# Patient Record
Sex: MALE | URBAN - METROPOLITAN AREA
[De-identification: names, ages, dates, MRNs, and addresses within clinical notes are randomized per-mention and may not be internally consistent; named-entity substitution may affect disease eponyms.]

---

## 2019-08-09 ENCOUNTER — OFFICE VISIT - HEALTHEAST (OUTPATIENT)
Dept: FAMILY MEDICINE | Facility: CLINIC | Age: 39
End: 2019-08-09

## 2019-08-09 DIAGNOSIS — M10.9 ACUTE GOUT OF RIGHT ELBOW, UNSPECIFIED CAUSE: ICD-10-CM

## 2019-08-09 DIAGNOSIS — M70.21 OLECRANON BURSITIS OF RIGHT ELBOW: ICD-10-CM

## 2019-08-09 DIAGNOSIS — R03.0 ELEVATED BLOOD PRESSURE READING WITHOUT DIAGNOSIS OF HYPERTENSION: ICD-10-CM

## 2019-08-09 ASSESSMENT — MIFFLIN-ST. JEOR: SCORE: 1971.74

## 2019-08-10 ENCOUNTER — COMMUNICATION - HEALTHEAST (OUTPATIENT)
Dept: SCHEDULING | Facility: CLINIC | Age: 39
End: 2019-08-10

## 2019-08-10 ENCOUNTER — OFFICE VISIT - HEALTHEAST (OUTPATIENT)
Dept: FAMILY MEDICINE | Facility: CLINIC | Age: 39
End: 2019-08-10

## 2019-08-10 DIAGNOSIS — M10.9 ACUTE GOUT OF RIGHT ELBOW, UNSPECIFIED CAUSE: ICD-10-CM

## 2019-08-14 ENCOUNTER — OFFICE VISIT - HEALTHEAST (OUTPATIENT)
Dept: FAMILY MEDICINE | Facility: CLINIC | Age: 39
End: 2019-08-14

## 2019-08-14 ENCOUNTER — COMMUNICATION - HEALTHEAST (OUTPATIENT)
Dept: NURSING | Facility: CLINIC | Age: 39
End: 2019-08-14

## 2019-08-14 DIAGNOSIS — M10.9 ACUTE GOUT OF RIGHT ELBOW, UNSPECIFIED CAUSE: ICD-10-CM

## 2019-08-14 DIAGNOSIS — M10.9 ACUTE GOUT OF RIGHT KNEE, UNSPECIFIED CAUSE: ICD-10-CM

## 2019-08-14 DIAGNOSIS — R03.0 ELEVATED BLOOD PRESSURE READING WITHOUT DIAGNOSIS OF HYPERTENSION: ICD-10-CM

## 2019-08-15 ENCOUNTER — COMMUNICATION - HEALTHEAST (OUTPATIENT)
Dept: NURSING | Facility: CLINIC | Age: 39
End: 2019-08-15

## 2019-08-20 ENCOUNTER — COMMUNICATION - HEALTHEAST (OUTPATIENT)
Dept: FAMILY MEDICINE | Facility: CLINIC | Age: 39
End: 2019-08-20

## 2019-08-21 ENCOUNTER — OFFICE VISIT - HEALTHEAST (OUTPATIENT)
Dept: FAMILY MEDICINE | Facility: CLINIC | Age: 39
End: 2019-08-21

## 2019-08-21 DIAGNOSIS — M10.9 ACUTE GOUT OF RIGHT ELBOW, UNSPECIFIED CAUSE: ICD-10-CM

## 2019-08-21 DIAGNOSIS — R03.0 ELEVATED BLOOD PRESSURE READING WITHOUT DIAGNOSIS OF HYPERTENSION: ICD-10-CM

## 2019-08-21 DIAGNOSIS — M10.9 ACUTE GOUT OF RIGHT KNEE, UNSPECIFIED CAUSE: ICD-10-CM

## 2019-09-02 ENCOUNTER — OFFICE VISIT - HEALTHEAST (OUTPATIENT)
Dept: FAMILY MEDICINE | Facility: CLINIC | Age: 39
End: 2019-09-02

## 2019-09-02 DIAGNOSIS — M10.9 GOUT OF RIGHT ELBOW, UNSPECIFIED CAUSE, UNSPECIFIED CHRONICITY: ICD-10-CM

## 2019-09-08 ENCOUNTER — OFFICE VISIT - HEALTHEAST (OUTPATIENT)
Dept: FAMILY MEDICINE | Facility: CLINIC | Age: 39
End: 2019-09-08

## 2019-09-08 DIAGNOSIS — R03.0 ELEVATED BLOOD PRESSURE READING WITHOUT DIAGNOSIS OF HYPERTENSION: ICD-10-CM

## 2019-09-08 DIAGNOSIS — M10.041 ACUTE IDIOPATHIC GOUT OF RIGHT HAND: ICD-10-CM

## 2019-09-11 ENCOUNTER — COMMUNICATION - HEALTHEAST (OUTPATIENT)
Dept: FAMILY MEDICINE | Facility: CLINIC | Age: 39
End: 2019-09-11

## 2019-09-11 ENCOUNTER — OFFICE VISIT - HEALTHEAST (OUTPATIENT)
Dept: FAMILY MEDICINE | Facility: CLINIC | Age: 39
End: 2019-09-11

## 2019-09-11 ENCOUNTER — OFFICE VISIT - HEALTHEAST (OUTPATIENT)
Dept: RHEUMATOLOGY | Facility: CLINIC | Age: 39
End: 2019-09-11

## 2019-09-11 DIAGNOSIS — M25.50 POLYARTHRALGIA: ICD-10-CM

## 2019-09-11 DIAGNOSIS — M79.601 PAIN OF RIGHT UPPER EXTREMITY: ICD-10-CM

## 2019-09-11 DIAGNOSIS — M10.9 ACUTE GOUT OF RIGHT ELBOW, UNSPECIFIED CAUSE: ICD-10-CM

## 2019-09-11 DIAGNOSIS — R52 PAIN MANAGEMENT: ICD-10-CM

## 2019-09-11 ASSESSMENT — MIFFLIN-ST. JEOR: SCORE: 1989.88

## 2019-09-12 ENCOUNTER — OFFICE VISIT - HEALTHEAST (OUTPATIENT)
Dept: FAMILY MEDICINE | Facility: CLINIC | Age: 39
End: 2019-09-12

## 2019-09-12 DIAGNOSIS — M10.9 ACUTE GOUT OF RIGHT KNEE, UNSPECIFIED CAUSE: ICD-10-CM

## 2019-09-12 DIAGNOSIS — R52 PAIN MANAGEMENT: ICD-10-CM

## 2019-09-12 DIAGNOSIS — M10.9 ACUTE GOUT OF RIGHT ELBOW, UNSPECIFIED CAUSE: ICD-10-CM

## 2019-09-12 DIAGNOSIS — M79.601 PAIN OF RIGHT UPPER EXTREMITY: ICD-10-CM

## 2019-09-12 RX ORDER — GABAPENTIN 300 MG/1
CAPSULE ORAL
Qty: 90 CAPSULE | Refills: 2 | Status: SHIPPED | OUTPATIENT
Start: 2019-09-12

## 2019-09-12 ASSESSMENT — MIFFLIN-ST. JEOR: SCORE: 2021.63

## 2019-09-18 ENCOUNTER — AMBULATORY - HEALTHEAST (OUTPATIENT)
Dept: PALLIATIVE MEDICINE | Facility: OTHER | Age: 39
End: 2019-09-18

## 2019-10-20 ENCOUNTER — OFFICE VISIT - HEALTHEAST (OUTPATIENT)
Dept: FAMILY MEDICINE | Facility: CLINIC | Age: 39
End: 2019-10-20

## 2019-10-20 DIAGNOSIS — M10.9 ACUTE GOUTY ARTHRITIS: ICD-10-CM

## 2019-10-23 ENCOUNTER — OFFICE VISIT - HEALTHEAST (OUTPATIENT)
Dept: FAMILY MEDICINE | Facility: CLINIC | Age: 39
End: 2019-10-23

## 2019-10-23 DIAGNOSIS — M10.9 ACUTE GOUT, UNSPECIFIED CAUSE, UNSPECIFIED SITE: ICD-10-CM

## 2019-10-23 ASSESSMENT — MIFFLIN-ST. JEOR: SCORE: 2044.31

## 2019-10-30 ENCOUNTER — AMBULATORY - HEALTHEAST (OUTPATIENT)
Dept: PALLIATIVE MEDICINE | Facility: OTHER | Age: 39
End: 2019-10-30

## 2019-11-06 ENCOUNTER — OFFICE VISIT - HEALTHEAST (OUTPATIENT)
Dept: FAMILY MEDICINE | Facility: CLINIC | Age: 39
End: 2019-11-06

## 2019-11-06 DIAGNOSIS — M10.9 ACUTE GOUT OF RIGHT ELBOW, UNSPECIFIED CAUSE: ICD-10-CM

## 2019-11-06 ASSESSMENT — MIFFLIN-ST. JEOR: SCORE: 2080.6

## 2020-01-08 ENCOUNTER — COMMUNICATION - HEALTHEAST (OUTPATIENT)
Dept: RHEUMATOLOGY | Facility: CLINIC | Age: 40
End: 2020-01-08

## 2020-06-10 ENCOUNTER — OFFICE VISIT - HEALTHEAST (OUTPATIENT)
Dept: FAMILY MEDICINE | Facility: CLINIC | Age: 40
End: 2020-06-10

## 2020-06-10 DIAGNOSIS — M25.521 RIGHT ELBOW PAIN: ICD-10-CM

## 2020-06-10 DIAGNOSIS — M25.50 POLYARTHRALGIA: ICD-10-CM

## 2020-06-10 DIAGNOSIS — M10.9 GOUT OF RIGHT ELBOW, UNSPECIFIED CAUSE, UNSPECIFIED CHRONICITY: ICD-10-CM

## 2020-06-10 RX ORDER — IBUPROFEN 800 MG/1
800 TABLET, FILM COATED ORAL EVERY 8 HOURS PRN
Qty: 90 TABLET | Refills: 0 | Status: SHIPPED | OUTPATIENT
Start: 2020-06-10

## 2020-06-10 RX ORDER — OXYCODONE HYDROCHLORIDE 5 MG/1
5 TABLET ORAL EVERY 6 HOURS PRN
Qty: 12 TABLET | Refills: 0 | Status: SHIPPED | OUTPATIENT
Start: 2020-06-10

## 2020-06-16 ENCOUNTER — OFFICE VISIT - HEALTHEAST (OUTPATIENT)
Dept: FAMILY MEDICINE | Facility: CLINIC | Age: 40
End: 2020-06-16

## 2020-06-16 DIAGNOSIS — M10.9 ACUTE GOUT, UNSPECIFIED CAUSE, UNSPECIFIED SITE: ICD-10-CM

## 2020-06-16 DIAGNOSIS — M25.50 POLYARTHRALGIA: ICD-10-CM

## 2020-06-16 ASSESSMENT — PATIENT HEALTH QUESTIONNAIRE - PHQ9: SUM OF ALL RESPONSES TO PHQ QUESTIONS 1-9: 0

## 2020-08-19 ENCOUNTER — OFFICE VISIT - HEALTHEAST (OUTPATIENT)
Dept: FAMILY MEDICINE | Facility: CLINIC | Age: 40
End: 2020-08-19

## 2020-08-19 DIAGNOSIS — M25.521 RIGHT ELBOW PAIN: ICD-10-CM

## 2021-05-27 ASSESSMENT — PATIENT HEALTH QUESTIONNAIRE - PHQ9: SUM OF ALL RESPONSES TO PHQ QUESTIONS 1-9: 0

## 2021-05-31 NOTE — PATIENT INSTRUCTIONS - HE
Please go to the emergency department if you notice any of the following:    Fever / chills.    Nausea.    Worsening pain.

## 2021-05-31 NOTE — PROGRESS NOTES
Subjective:      Daniele Rivera is a 39 y.o. male who presents for evaluation of follow-up of gout.  Long history of gout.  He has been seen twice in the past week for gout flares.  First visit was on 8/9/2019, second visit on 8/10/2019.  I reviewed both of these office visits.  Looks like the first visit was at [facility] and second visit was walk-in care.  Both visits were for acute gout and pain in right elbow.  He was initially put on a several day prednisone taper and limited supply of 10 mg oxycodone.  He returned to walk-in care the next day stating that nothing was helping with his pain.  Walk-in care advised him to stop prednisone and start Indocin.  He had almost completed his oxycodone from the day before.  New prescription was sent for oxycodone limited supply 15 mg.  He was recommended to follow-up with rheumatology.  He says he has called them, but was told there are not any available appointments for several months.    Today, he notes that he is still having severe pain in the right elbow due to gout.  He sometimes has shooting pains up or down the arm.  Additionally, his right knee has now started to flareup.  Pain there is also severe as well.  He has some swelling in the right elbow.  He was hoping that one of the previous providers would be willing to drain his elbow.  He says when his joints are drained the pain improves significantly.  Overall he has not had a gout flare for a few years.  This current flareup has now lasted approximately 6 to 7 days.  He says his flares usually last 2 weeks.  Past history of allopurinol use, but he has not been using this for some time.  Previous visit reports diarrhea and nausea with taking colchicine.  Today patient is wondering if we have some way for him to take this (colchicine) with reduced cost.  Additionally, he does not have insurance.  He is in the process of getting insurance.  He thinks he may have insurance by the end of this month.  He is  right-hand dominant.  He had worn a sling on the right arm, but the sling touching his elbow was more painful than not wearing the sling at all.    We do not have any old records.  He is in the process of getting [place].  He reports that in the past, when he has had a flareup, he always takes 15 mg of oxycodone regularly for 2 weeks.  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    Past medical, surgical, family history reviewed.    Patient Active Problem List   Diagnosis     Acute gout of right knee, unspecified cause     Acute gout of right elbow, unspecified cause       Current Outpatient Medications:      indomethacin (INDOCIN) 50 MG capsule, Take 1 capsule (50 mg total) by mouth 2 (two) times a day with meals., Disp: 20 capsule, Rfl: 0     predniSONE (DELTASONE) 20 MG tablet, 60mg x4 days 40mg x4 days 20mg x 4 days 10mg x 4 days., Disp: 26 tablet, Rfl: 0     oxyCODONE (ROXICODONE) 15 MG immediate release tablet, Take 1 tablet (15 mg total) by mouth every 6 (six) hours as needed for pain., Disp: 28 tablet, Rfl: 0     Objective:     No Known Allergies  Vitals:    08/14/19 0906   BP: (!) 148/92   Pulse: 80   Resp: 16     Body mass index is 34.72 kg/m .    Vitals reviewed as above.  General: Patient is alert and oriented x 3, in no apparent distress  Cardiac: Regular rate and rhythm, no murmurs  Pulmonary: lungs clear to ausculation, no crackles, rales, rhonchi, or wheezing  Musculoskeletal: Exam significantly limited today due to patient's pain, mild edema at the right elbow, no fluctuance, pain out of proportion with exam findings on gentle palpation of the right elbow, patient refuses to move the elbow at all due to pain, right knee is grossly normal, no obvious edema, again pain out of proportion with exam findings to touch on the right knee, patient holds his right arm against his body for the entire visit and walks with a limp    Assessment and  Plan:     1. Acute gout flare, right elbow and right knee.  We are limited in what we can do for him given his lack of insurance and inability to do a complete exam today.  I would not be able to remove fluid from his knee today as I noticed no obvious areas of fluctuance.  I am hesitant to drain his right elbow, due to increased risk of infection, and again lack of obvious fluctuant area.  I discussed this with patient today.  He is a bit frustrated that he cannot treat this specifically.  Patient does not think indomethacin is helping much.  He will stop that and restart his prednisone taper.  He has the prednisone at home.  He has finished his oxycodone from both previous providers.  He is requesting enough oxycodone to last him through the rest of his gout flare.  He says normally, in the past, he had larger amounts of oxycodone prescribed regularly as needed for his gout attacks.  Prescription monitoring report run for patient.  No prescriptions, other than the 2 noted above, for patient in the past year in [place] and the surrounding \[facility]\"".  Patient stated he was from [place].  [place] is not available to check in database.  [place] was searched and was negative.  Prescription sent today for 1 week for oxycodone 15 mg for patient to take 1 pill every 6 hours as needed.  This is a one time prescription.  Patient did not want to stay for scheduling today.  Referral placed for him to see rheumatology soon.  It is possible they may be able to drain his joints if appropriate.  Also referral placed for care management health insurance to check and make sure health insurance is in the process of being finalized.  Once he has insurance, would recommend returning for full physical and associated screening lab work with one of the medical doctors at our clinic.  Patient is somewhat upset that I am not able to give him more oxycodone today.  I reviewed general clinic and pharmacy narcotics policies with him.  I  also reviewed with him that if pain is not helped enough by current prescriptions, he should go in to the emergency room.  He asked that oxycodone prescription be printed for him today.  He is going to try to find a pharmacy that charges less for this medication than the one he picked it up from previously.    2.  Elevated blood pressure.  BP Readings from Last 3 Encounters:   08/14/19 (!) 148/92   08/10/19 (!) 168/104   08/09/19 (!) 154/98   Patient does not think he has been diagnosed with hypertension before.  He feels that his blood pressure is elevated due to pain.  As stated above, I would recommend he return for close follow-up of blood pressure within the next 2 to 4 weeks.    This dictation uses voice recognition software, which may contain typographical errors.

## 2021-05-31 NOTE — PROGRESS NOTES
Assessment/ Plan:     1. Acute gout of right elbow, unspecified cause  predniSONE (DELTASONE) 20 MG tablet    oxyCODONE (ROXICODONE) 10 mg immediate release tablet    Uric Acid    Basic Metabolic Panel    Ambulatory referral to Orthopedics   2. Olecranon bursitis of right elbow  Ambulatory referral to Orthopedics   3. Elevated blood pressure reading without diagnosis of hypertension       Patient has symptoms of an acute gout flare given his medical history gave patient a prednisone taper and a limited supply of oxycodone 10 mg.  Advised continued symptomatic management including NSAIDs and ice.  Encouraged to follow-up with orthopedics for additional evaluation of the elbow.  Additionally discussed rheumatology referral with patient he will let me know if he would like to pursue this.  I did discuss that rheumatology is out a couple months.  Patient was requesting his elbow to have be drained.  I discussed with patient that this is not something that I would do in clinic to see a specialist for evaluation this is best course for treatment.  Discussed with patient that if symptoms become more severe or is developing other symptoms of fever, body aches, or chills that he would need to present to the emergency department for further evaluation.  He is in agreement with this plan.    Blood pressure is elevated today.  I discussed with patient that it would be advisable for him to return to the clinic to have his blood pressure rechecked when his pain is under control to assure he is not hypertensive.  I also recommended blood work to be completed however, he declined due to not having insurance.  I placed these as a future order.    Subjective:      Daniele Rivera is a 39 y.o. male who presents for concerns of a gout flare.  He is a new patient to our clinic and was previously living in [place].  I do not have old records available.  He has had gout in multiple joints on and off over the last 10 years. The  last 2 years he was without a flair until 2 days ago.  His gout symptoms today are present in his right elbow.  He is experiencing acute severe pain in the right elbow along with swelling.  He reports that he has required surgery on this left elbow previously due to gout.  He also reports that he is frequently required to the fluid to be drained.  He has been on allopurinol in the past when he is not in a flaire but has not been on this for a long time. He did not do well on the Colchicine.  Reporting, symptoms of diarrhea and nausea.  Pain is currently rating a 9/10. He is asking for some prednisone and a limited supply of oxycodone to help with the pain.  He is currently without insurance.  But is waiting for this to be approved.    Blood pressure is elevated today.  He denies any previous history of hypertension.  He believes that his blood pressure is elevated due to acute pain.    He denies any fevers, body aches, or chills.  He denies any chest pain, shortness of breath, or difficulty breathing.    The following portions of the patient's history were reviewed and updated as appropriate: allergies, current medications, past family history, past medical history, past social history, past surgical history and problem list.    There is no problem list on file for this patient.    Past Medical History:   Diagnosis Date     Gout attack      Past Surgical History:   Procedure Laterality Date     ELBOW SURGERY Right 2017     Social History     Socioeconomic History     Marital status:      Spouse name: Not on file     Number of children: Not on file     Years of education: Not on file     Highest education level: Not on file   Occupational History     Not on file   Social Needs     Financial resource strain: Not on file     Food insecurity:     Worry: Not on file     Inability: Not on file     Transportation needs:     Medical: Not on file     Non-medical: Not on file   Tobacco Use     Smoking status: Never  "Smoker     Smokeless tobacco: Never Used   Substance and Sexual Activity     Alcohol use: Not Currently     Frequency: Never     Drug use: Never     Sexual activity: Yes     Partners: Female   Lifestyle     Physical activity:     Days per week: Not on file     Minutes per session: Not on file     Stress: Not on file   Relationships     Social connections:     Talks on phone: Not on file     Gets together: Not on file     Attends [employer] service: Not on file     Active member of club or organization: Not on file     Attends meetings of clubs or organizations: Not on file     Relationship status: Not on file     Intimate partner violence:     Fear of current or ex partner: Not on file     Emotionally abused: Not on file     Physically abused: Not on file     Forced sexual activity: Not on file   Other Topics Concern     Not on file   Social History Narrative     Not on file     Family History   Problem Relation Age of Onset     Heart disease Mother      Heart attack Mother      Gout Father      Diabetes Father        Current Outpatient Medications   Medication Sig     oxyCODONE (ROXICODONE) 10 mg immediate release tablet Take 0.5-1 tablets (5-10 mg total) by mouth every 4 (four) hours as needed for pain.     predniSONE (DELTASONE) 20 MG tablet 60mg x4 days 40mg x4 days 20mg x 4 days 10mg x 4 days.       Review of Systems   A 12 point comprehensive review of systems was negative except as noted.      Objective:      BP (!) 154/98 (Patient Site: Left Arm, Patient Position: Sitting, Cuff Size: Adult Large)   Pulse 80   Resp 16   Ht 5' 10\" (1.778 m)   Wt (!) 236 lb (107 kg)   BMI 33.86 kg/m      General appearance: alert, appears stated age and cooperative  Head: Normocephalic, without obvious abnormality, atraumatic  Lungs: clear to auscultation bilaterally  Heart: regular rate and rhythm, S1, S2 normal, no murmur, click, rub or gallop  Extremities: Right elbow is acutely tender to palpation over medial cranial " bone it is also acutely swollen. mild erythremia and warmth.  Patient is holding the arm in a bent position as this is what feels most comfortable.  He reports he has full range of motion but with pain.  Pulses: 2+ and symmetric  Skin: Skin color, texture, turgor normal. No rashes or lesions  Lymph nodes: Cervical, supraclavicular, and axillary nodes normal.  Neurologic: Grossly normal      María Berrios, SUHAIL  12:33 PM

## 2021-05-31 NOTE — TELEPHONE ENCOUNTER
He needs to establish care with a doctor (not me) for ongoing care and treatment.  He should keep the appt with Dr. Greenberg.

## 2021-05-31 NOTE — PROGRESS NOTES
Attempt 1:  Care guide left a message for the patient about [facility] enrollment.  If the patient is trying to call the Care guide back transfer them to [facility] at [phone].    Next Outreach: 8/15/19

## 2021-05-31 NOTE — TELEPHONE ENCOUNTER
Pharmacist calling to verify that RX for Oxycodone 15 mg had been written today. Information verified in epic--written by PA @ [facility] [place] today. CLG    Reason for Disposition    Pharmacy calling with prescription question and triager answers question    Protocols used: MEDICATION QUESTION CALL-A-

## 2021-05-31 NOTE — PROGRESS NOTES
Community Health Worker called and left a message for the patient.  If the patient is returning my call, please transfer the patient to [facility] at ext. [phone].   Patient has been mailed a unreachable letter and was provided with CHW contact information if they are interested in accessing Clinic Care Coordination.  Order for Care Management has been closed, no further outreach will be done at this time and patient can be re-referred.

## 2021-05-31 NOTE — PROGRESS NOTES
Assessment and Plan     Daniele was seen today for gout.    Diagnoses and all orders for this visit:    Acute gout of right elbow, unspecified cause  -     indomethacin (INDOCIN) 50 MG capsule; Take 1 capsule (50 mg total) by mouth 2 (two) times a day with meals.  -     oxyCODONE (ROXICODONE) 15 MG immediate release tablet; Take 1 tablet (15 mg total) by mouth every 4 (four) hours as needed for pain.         HPI     Chief Complaint   Patient presents with     Gout     pains in R elbow x few days, fluid buildup        Daniele Rivera is a 39 y.o. male seen today for right elbow pain.    Long-standing history of gout with frequent flares of multiple joints.  Pain in his right elbow for the last several days, identical to previous gouty flares.  He has required surgery in this elbow before to remove tophi.  Started prednisone yesterday with no improvement.  Prescribed allopurinol but when he went 2 years without any flares he stopped taking it.  This was several months ago.  No fevers, chills, nausea.       Current Outpatient Medications:      oxyCODONE (ROXICODONE) 10 mg immediate release tablet, Take 0.5-1 tablets (5-10 mg total) by mouth every 4 (four) hours as needed for pain., Disp: 8 tablet, Rfl: 0     predniSONE (DELTASONE) 20 MG tablet, 60mg x4 days 40mg x4 days 20mg x 4 days 10mg x 4 days., Disp: 26 tablet, Rfl: 0     indomethacin (INDOCIN) 50 MG capsule, Take 1 capsule (50 mg total) by mouth 2 (two) times a day with meals., Disp: 20 capsule, Rfl: 0     oxyCODONE (ROXICODONE) 15 MG immediate release tablet, Take 1 tablet (15 mg total) by mouth every 4 (four) hours as needed for pain., Disp: 10 tablet, Rfl: 0     Reviewed and updated: medical history, medications and allergies.     Review of Systems     General: Denies fever, chills, fatigue.  Respiratory: Denies dyspnea, cough, wheezing.  GI: Denies nausea, vomiting, diarrhea, constipation.     Objective     Vitals:    08/10/19 1408   BP: (!) 168/104   Pulse:  79   Resp: 14   Temp: 99.4  F (37.4  C)   TempSrc: Oral   SpO2: 99%   Weight: (!) 240 lb 3.2 oz (109 kg)        Reviewed vital signs.  General: Appears calm, comfortable. Answers questions quickly and appropriately with clear speech. No apparent distress.  Skin: Pink, warm, dry.  HENT: Normocephalic, atraumatic.  Neck: Supple.  Cardiovascular: Strong, regular radial pulse.  Respiratory: Normal respiratory effort.  MSK: Supporting right elbow at 90 degrees.  Significant pain with any flexion or extension of the elbow.  Pain with any light pressure over the joint.  The olecranon bursa also appears to be enlarged.  There is no heat or erythema overlying the joint to the bursa.  Neuro: Memory and cognition appear normal. Normal gait.  Psych: Mood and affect appear normal.     Imaging:   No results found.    Labs:  No results found for this or any previous visit (from the past 24 hour(s)).     Medical Decision-Making     Daniele is a well-appearing 39-year-old male with no significant medical history aside from gout.  Several days of severe pain in the right elbow.  Pain is exacerbated with any movement of the elbow or with light pressure on the joint.  Over the last 2 days he is developed swelling over the olecranon bursa.  His appearance is nontoxic.  He is not tachycardic, tachypneic, hypoxic, or febrile.  He has failed a couple days of prednisone.  In the past these had successful treatment with indomethacin.  I did prescribe a small supply of indomethacin recommending that he start 3 times daily and then switch to once a day once the flare starts to improve.    Overall I have low concern for septic joint as the joint is not erythematous or hot and he has no systemic symptoms.  Additionally he has an extensive history of gout and states this feels identical to previous flares.  If his symptoms do not improve, he will follow-up with orthopedics.    Reviewed red flags that would trigger a prompt return to the clinic as  noted below under patient instructions.  He expressed understanding of these directions and is in agreement with the plan.     Patient Instructions     Patient Instructions   Please go to the emergency department if you notice any of the following:    Fever / chills.    Nausea.    Worsening pain.        Discussed benefit vs risk of medications, dosing, side effects.  Patient was able to verbalize understanding.  After visit summary was provided for patient.     Miguel Mena PA-C

## 2021-05-31 NOTE — TELEPHONE ENCOUNTER
"Attempted to leave message two times. Kept on getting cut off. Will try again later. #1  \" Okay to relay message\"    "

## 2021-05-31 NOTE — PROGRESS NOTES
Dx pseudogout and gout  Made on joint aspiration.  Flares in knees and elbows.    Gout in knee, pseudo gout in elbow and toe.    [facility].  Dr Rubin    In [place]   Rheumatologist        Allopurinol associated with flares.    Had taken prn flare.   Admittedly did not taken well.    Probenecid taken pretty well.   Did well as took regularly.  Two years.      Stopped as thought not needed.    Colchicine the worst drug.  Poopin.   Expensive..  Unable to tolerate the gi effects    Indocin...   Ibuprofen 800 3-4 /d    Did well.  Also need pain meds additionally for night sleep.    Prednisone I loved but it made me gain wt.   Was tapering from 60.  Two wks at a time.    Has appt September 11.  [facility]    bp are normal when not in pain     my pain is so bad I need 15mg oxycodone.  I think it's because of pseudogout and gout    OBJECTIVE:   Vitals:    08/21/19 1459   BP: 166/88   Pulse: 92   Resp: 12      Eyes: non icteric, noninflamed  Lungs: no resp distress  Heart: regular  Ankles: no edema  Muscles: nontender  Mental status: mild anxious    Right knee mild effusion and palpably warmer than left.   Walks consistent with significant pain on weight bearing    Right elbow olecranon enlargement and skin scars consistent with tophi and hx prior surgery to remove    Neuro: nonfocal    Body mass index is 33.86 kg/m .     ASSESSMENT/PLAN:    Additional diagnoses and related orders:  1. Acute gout of right knee, unspecified cause     2. Acute gout of right elbow, unspecified cause  predniSONE (DELTASONE) 5 MG tablet    oxyCODONE (ROXICODONE) 15 MG immediate release tablet   3. Elevated blood pressure reading without diagnosis of hypertension       I spent 25 minutes with patient face-to-face, of which 50% or greater was spent in counseling and coordination of care in regards to patient's problems and diagnoses as listed above. Please see plan above.   re usual gout flare of 15/d prednisone.   Will use  15-30 initially to calm down then taper off.    Prn oxycodone.   Scripts given printed.  Has appt [facility] rheumatologist 9/11      Asked to have info sent here.  Try to see that provider off meds  Get records to the rheumatologist from prior providers.  Especially when dx made.

## 2021-05-31 NOTE — TELEPHONE ENCOUNTER
New Appointment Needed  What is the reason for the visit:    Same Date/Next Day Appt Request  What is the reason for your visit?: follow up gout    Provider Preference: PCP only  How soon do you need to be seen?: patient is scheduled on 8/21 with Dr Greenberg but would prefer to be seen by Kristina Cruz on 8/20 or 8/21  Waitlist offered?: Yes  Okay to leave a detailed message:  Yes

## 2021-06-01 NOTE — TELEPHONE ENCOUNTER
Per last visit, pt due to see rheumatologist today.  Should address med issues at his visit today.

## 2021-06-01 NOTE — PROGRESS NOTES
Chief Complaint   Patient presents with     Edema     swelling in elbow and fingers seen 8/2/19 and 9/8/19 Sx not improving, has not seen PCP and was at rheum seen today     History of Present Illness: Rooming staff notes reviewed. Most of pain is in right 2nd, 3rd, and 4th fingers. He states this is related to his gout problem, and he continues to have problems with gout in his right elbow. He saw the rheumatologist on day of exam before he saw me, with the documentation not yet being available to view. He told me that he was advised he needed to be seen by his primary care provider to get any narcotic medications. He told me he was not able to get an appointment to see Dr. Greenberg for two weeks, and requested enough narcotic medication to last for that long, which I told him was not typically done through an urgent care setting or by urgent care providers with no long term relationship with him. I advised him that I would provider enough narcotic medication until he could get in to his primary care clinic within the next week.     Review of systems: See history of present illness, all others negative.     Current Outpatient Medications   Medication Sig Dispense Refill     ibuprofen (ADVIL,MOTRIN) 800 MG tablet Take 1 tablet (800 mg total) by mouth every 8 (eight) hours as needed for pain. 21 tablet 2     predniSONE (DELTASONE) 20 MG tablet Take 80 mg by mouth daily for 3 days, THEN 60 mg daily for 3 days, THEN 40 mg daily for 4 days, THEN 20 mg daily for 4 days, THEN 10 mg daily for 4 days. 35 tablet 0     oxyCODONE (ROXICODONE) 15 MG immediate release tablet Take 1 tablet (15 mg total) by mouth every 6 (six) hours as needed for pain. 6 tablet 0     No current facility-administered medications for this visit.      Past Medical History:   Diagnosis Date     Gout attack       Past Surgical History:   Procedure Laterality Date     ELBOW SURGERY Right 2017    gouty tophi      Social History     Tobacco Use     Smoking  status: Never Smoker     Smokeless tobacco: Never Used   Substance Use Topics     Alcohol use: Not Currently     Frequency: Never     Drug use: Never        Family History   Problem Relation Age of Onset     Heart disease Mother      Heart attack Mother      Gout Father      Diabetes Father        Vitals:    09/11/19 1102 09/11/19 1103   BP: (!) 151/92 (!) 152/94   Patient Site: Right Arm Left Arm   Patient Position: Sitting Sitting   Cuff Size: Adult Regular Adult Regular   Pulse: 87    Temp: 98.7  F (37.1  C)    TempSrc: Oral    SpO2: 97%    Weight: (!) 246 lb 8 oz (111.8 kg)        EXAM:   General: Vital signs reviewed, notable for elevated blood pressure similar to last exam in walk in care clinic. Patient is in some distress due to hand and elbow discomfort, and I believe anxiety about his care arrangements. Breathing is non labored appearing. Patient is alert and oriented x 3.     Physical exam is significant for moderate edema to his right elbow olecranon region, and right 2nd, 3rd, and 4th fingers.    Assessment/Plan   1. Pain management  oxyCODONE (ROXICODONE) 15 MG immediate release tablet       Patient Instructions   Keep your appointment made for you at your primary care clinic tomorrow for further pain management. You should get any narcotic management through this clinic when possible. I recommend you not be charged for this visit since you told me you had difficulty initially getting in to see your primary care provider sooner then in 2 weeks. When your pain is better controlled, you should discuss with your primary care provider monitoring this, and addressing possible hypertension.       Maurizio Welch, DO

## 2021-06-01 NOTE — TELEPHONE ENCOUNTER
Who is calling:  Patient  Reason for Call:  Patient would like to speak with PCP about his medications.   Date of last appointment with primary care: 8/21/19  Okay to leave a detailed message: Yes

## 2021-06-01 NOTE — PROGRESS NOTES
Pt returns saying rheumatologist said I was to write the narcotics    Continued pain    Says has appt at a pain clinic on the fourth of next month.    Severe pain.  Feels led around and not listened to     Reports his encounter different than depicted in rheum note         OBJECTIVE:   Vitals:    09/12/19 1545   BP: (!) 160/96   Pulse: 100      Eyes: non icteric, noninflamed  Lungs: no resp distress  Heart: regular  Ankles: no edema  Muscles: nontender  Mental status: euthymic  Neuro: nonfocal    Body mass index is 35.44 kg/m .    holds arm close to chest.    No apparent visible inflammation in hand where pain is described    ASSESSMENT/PLAN:    Referred to pain clinic.   Told this rx for oxycodone was last from here as there is no indication for ongoing use that is apparent during the last several evaluations.  Pt told to get records [place] prior provider [place] of [place] so they are available to pain clinic.    Directed to follow up with rheumatology as suggested.    For the chronic pain pt is started on gabapentin and to have the pain clinic take over the management of the pain.  If unable to bridge to pain management will titrate the gabapentin.  No more narcotic to be written.    Addendum  Per [place]   28 pills for 12 days from 8/21 to 9/2  46 pills for 10 days from 9/2 to today 9/12    Pt told that today's prescription is to last to the pain clinic appt and to be used very sparingly as it is not meant to be ongoing.      1. Acute gout of right knee, unspecified cause  Ambulatory referral to Pain Clinic    gabapentin (NEURONTIN) 300 MG capsule    oxyCODONE (ROXICODONE) 15 MG immediate release tablet   2. Acute gout of right elbow, unspecified cause  Ambulatory referral to Pain Clinic    gabapentin (NEURONTIN) 300 MG capsule    oxyCODONE (ROXICODONE) 15 MG immediate release tablet   3. Pain of right upper extremity  Ambulatory referral to Pain Clinic    gabapentin (NEURONTIN) 300 MG capsule    oxyCODONE (ROXICODONE) 15  MG immediate release tablet   4. Pain management  oxyCODONE (ROXICODONE) 15 MG immediate release tablet     I spent 25 minutes with patient face-to-face, of which 50% or greater was spent in counseling and coordination of care in regards to patient's problems and diagnoses as listed above. Please see plan above.

## 2021-06-01 NOTE — PROGRESS NOTES
Referral received to the [facility] pain center.  Referral note indicated the patient has an appointment at a pain clinic on the fourth of next month.  I have left a message with the patient to clarify so that we are not duplicating evaluations.

## 2021-06-01 NOTE — PATIENT INSTRUCTIONS - HE
Keep your appointment made for you at your primary care clinic tomorrow for further pain management. You should get any narcotic management through this clinic when possible. I recommend you not be charged for this visit since you told me you had difficulty initially getting in to see your primary care provider sooner then in 2 weeks. When your pain is better controlled, you should discuss with your primary care provider monitoring this, and addressing possible hypertension.

## 2021-06-01 NOTE — PROGRESS NOTES
ASSESSMENT AND PLAN:  Daniele Rivera 39 y.o. male is seen here on 09/11/19 for evaluation of severe pain affecting his right upper extremity involving his right hand, right elbow, background history of gout which appears to be crystal proven.  He is been started on 80 mg of prednisone 3 days ago.  He is beginning to notice improvement surgeon is a right elbow.  Today I have outlined to him that while it is likely that what he is experiencing is a flareup of gout, however other possibilities need to get under consideration, including septic arthritis such as of the right elbow, hands indication for aspiration and if appropriate injection with corticosteroids.  He would like to defer that.  One observation which is important to note here is the marked discrepancy between the objective findings of inflammation both on examination and also no definite ultrasonographic findings of hyperemia on examination of the PIPs where he complains of exquisite pain to the point where he was very reluctant for me to properly examine his right hand.  In view of this I have recommended that he considers an MRI of the right hand.  He would like to declined that for now in part because of insurance reasons.  He wondered if I could prescribe pain medications.  In view of his limitations in terms of following up on today's recommendations  he will return for follow-up in the next 4 weeks or so.      Diagnoses and all orders for this visit:    Acute gout of right elbow, unspecified cause    Pain of right upper extremity    Polyarthralgia      HISTORY OF PRESENTING ILLNESS:  Daniele Rivera, 39 y.o., male is here for severe pain.  This is in his right upper extremity.  He noted the pain is in his right hand, pointing to the PIPs and the DIPs of the right ring middle and index fingers, the right elbow, having started from the past 2 months.  He recalls a history of gout and pseudogout.  He reports that it was 9 years ago when he first  started hurting.  It was in his first MTP.  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  He had not had flareup during the past 3 years.  He has taken intermittently allopurinol and at times pain medications.  2 months ago he started hurting in the right knee, right hand and right elbow.  The knee pain has improved.  He was started on prednisone.  He feels that it is not doing as good a job as he had hoped for.  He reports no history of trauma.  There is no kidney stone history.  He noted that the pain level is 10/10.  He is unable to do any of his day-to-day activities.  There is no family history of gout rheumatoid arthritis psoriasis ulcerative colitis Crohn's disease.   Further historical information and ADL limitations as noted in the multidimensional health assessment questionnaire attached in the EMR. Rest of the 13 system ROS is negative.     ALLERGIES:Patient has no known allergies.    PAST MEDICAL/ACTIVE PROBLEMS/MEDICATION/ FAMILY HISTORY/SOCIAL DATA:  The patient has a family history of  Past Medical History:   Diagnosis Date     Gout attack      Social History     Tobacco Use   Smoking Status Never Smoker   Smokeless Tobacco Never Used     Patient Active Problem List   Diagnosis     Acute gout of right knee, unspecified cause     Acute gout of right elbow, unspecified cause     Elevated blood pressure reading without diagnosis of hypertension     Current Outpatient Medications   Medication Sig Dispense Refill     ibuprofen (ADVIL,MOTRIN) 800 MG tablet Take 1 tablet (800 mg total) by mouth every 8 (eight) hours as needed for pain. 21 tablet 2     predniSONE (DELTASONE) 20 MG tablet Take 80 mg by mouth daily for 3 days, THEN 60 mg daily for 3 days, THEN 40 mg daily for 4 days, THEN 20 mg daily for 4 days, THEN 10 mg daily for 4 days. 35 tablet 0     oxyCODONE (ROXICODONE) 15 MG immediate release tablet Take 1 tablet (15 mg  "total) by mouth every 6 (six) hours as needed for pain. 6 tablet 0     No current facility-administered medications for this visit.        COMPREHENSIVE EXAMINATION:  Vitals:    09/11/19 1011   BP: (!) 142/100   Patient Site: Right Arm   Patient Position: Sitting   Cuff Size: Adult Large   Pulse: 88   Weight: (!) 240 lb (108.9 kg)   Height: 5' 10\" (1.778 m)     A well appearing alert oriented male. Vital data as noted above. His eyes without inflammation/scleromalacia. ENTwithout oral mucositis, thrush, nasal deformity, external ear redness, deformity. His neck is without lymphadenopathy and supple. Lungs normal sounds, no pleural rub. Heart auscultation normal rate, rhythm; no pericardial rub and murmurs. Abdomen soft, non tender, no organomegaly. Skin examined for heliotrope, malar area eruption, lupus pernio, periungual erythema, sclerodactyly, papules, erythema nodosum, purpura, nail pitting, onycholysis, and obvious psoriasis lesion. Neurological examination shows normal alertness, speech, facial symmetry, tone and power in upper and lower extremities. The joint examination is performed for swelling, tenderness, warmth, erythema, and range of motion in the following joints: DIPs, PIPs, MCPs, wrists, first CMC's, elbows, shoulders, hips, knees, ankles, feet; spine for range of motion and paraspinal muscles for tenderness. The salient  findings are: He has swelling on the left elbow, olecranon area, scar from prior surgery, he is holding his right arm in semiflexed position, he expresses exquisite tenderness in the PIPs, DIPs of the index middle and ring finger without appreciable synovitis as for as can be ascertained.  None of his other palpable joints of all of the upper or lower extremities were tender inflamed or swollen.  He does not have dactylitis of digits in either of the toes.  He has nodularity such as along the left olecranon region.    LAB / IMAGING DATA:  No results found for: ALT, ALBUMIN, " CREATININE    No results found for: WBC, HGB, PLT    No results found for: VIOLETTA, RF, SEDRATE

## 2021-06-01 NOTE — PROGRESS NOTES
Walk In Care Note                                                                                 Date of Visit: 9/8/2019     Chief Complaint   Daniele Rivera is a(n) 39 y.o. Patient Declined male who presents to Walk In Care with the following complaint(s):  Gout (IN RIGHT ARM AND FINGERS (FINGERS ARE THE WORST) IF GETS RX MAKE IT PAPER)       Assessment and Plan   1. Acute idiopathic gout of right hand  - predniSONE (DELTASONE) 20 MG tablet; Take 80 mg by mouth daily for 3 days, THEN 60 mg daily for 3 days, THEN 40 mg daily for 4 days, THEN 20 mg daily for 4 days, THEN 10 mg daily for 4 days.  Dispense: 35 tablet; Refill: 0  - oxyCODONE (ROXICODONE) 15 MG immediate release tablet; Take 1 tablet (15 mg total) by mouth every 6 (six) hours as needed (severe pain).  Dispense: 12 tablet; Refill: 0    2. Elevated blood pressure reading without diagnosis of hypertension      Patient with reported history of both severe gout and pseudogout, with reported intolerance to allopurinol and colchicine, presenting with flare in the right hand over the past 2 days. Recent visits for gout symptoms have been reviewed in Epic. He was treated for a flare in the right elbow with prednisone burst / taper (60 mg x 4 days, 40 mg x 4 days, 20 mg x 4 days, 10 mg x 4 days) and oxycodone 10 mg tablets (#8 tablets) by Primary Care on 8/9/2019. He was also referred to Orthopedics at that time. He was seen the following day in Walk In Care and prescribed indomethacin 50 mg two times a day and oxycodone 15 mg tablets (#10 tablets). He was seen by Primary Care on 8/14/2019 for ongoing issues with the right elbow, at which time he received a refill of oxycodone 15 mg tablets (#28 tablets) and was referred to Rheumatology. He was seen by Primary Care on 8/21/2019 for ongoing symptoms in his right elbow and new symptoms in his right knee, at which time he was provided a prescription for #100 tablets of prednisone 5 mg with instructions to take 30 mg  daily x 3 days, 20 mg daily x 3 days, and 10 mg daily x 3 days. He also received a refill of oxycodone 15 mg tablets (#28 tablets) on 8/21/2019. He presented to Walk In Care again on 9/2/2019 due to symptoms in the right elbow and was prescribed prednisone 20 mg daily x 7 days, oxycodone 15 mg (#28 tablets), and ibuprofen 800 mg. His elbow pain has improved somewhat with prednisone 20 mg daily but he has now had severe pain in his right hand for the past 2 days. Treating with higher dose prednisone burst / taper as listed above. Agreed to provide a refill of a limited quantity of oxycodone 15 mg tablets; reviewed Patient Report in the [place] Prescription Monitoring Program prior to prescribing this opoid and noted no prescriptions other than those provided by various [facility] providers as detailed above. Instructed patient to follow up with Rheumatology as scheduled on 9/11/2019.     Patient to follow up with Primary Care provider regarding elevated blood pressure.     Counseled patient regarding assessment and plan for evaluation and treatment. Questions were answered. See AVS for the specific written instructions and educational handout(s) regarding gout flare that were provided at the conclusion of the visit.     Discussed signs / symptoms that warrant urgent / emergent medical attention.     Follow up as scheduled for consultation with Rheumatology on 9/11/2019.      History of Present Illness   Primary symptom: Pain  Onset: 2 day(s) ago  Location: Right hand 2nd-4th PIP joints  Radiation: Shooting pain up the arm  Progression: Worsening  Frequency: Constant  Description: Sharp  Rating (0-10): 10  Exacerbating factors: Movement, touching the area  Relieving factors: None  Associated bruising: No  Associated swelling: Yes, in the PIP joints and the right elbow  Associated erythema: Yes  Associated fevers: No  Associated chills: No  Home therapies utilized: Prednisone 20 mg daily that was prescribed on  9/2/2019 and oxycodone 15 mg four times a day as needed, which he ran out of yesterday.   Known injury: No  History of similar pain: Yes, with gout / pseudogout flares.   History of surgery in this area: Excision of tophi from the right elbow.   Tobacco use / exposure: No     Review of Systems   Review of Systems   All other systems reviewed and are negative.       Physical Exam   Vitals:    09/08/19 1330 09/08/19 1345   BP: (!) 165/99 (!) 188/108   Pulse: 84    Resp: 16    Temp: 98.6  F (37  C)    TempSrc: Oral    SpO2: 100%    Weight: (!) 240 lb (108.9 kg)      Physical Exam  Vitals signs and nursing note reviewed.   Constitutional:       General: He is in acute distress (mildly, due to right hand and elbow pain).      Appearance: He is well-developed and overweight. He is not toxic-appearing.   Cardiovascular:      Rate and Rhythm: Normal rate and regular rhythm.      Heart sounds: S1 normal and S2 normal. No murmur. No friction rub. No gallop.    Pulmonary:      Effort: Pulmonary effort is normal.      Breath sounds: Normal breath sounds. No stridor. No wheezing, rhonchi or rales.   Musculoskeletal:      Right elbow: He exhibits decreased range of motion and swelling (and warmth). Tenderness found. Medial epicondyle, lateral epicondyle and olecranon process tenderness noted.      Right hand: He exhibits decreased range of motion, tenderness and swelling (and erythema / warmth over the PIP joint of the 2nd, 3rd, and 4th fingers). He exhibits normal capillary refill. Normal sensation noted. Decreased strength noted.   Skin:     General: Skin is warm and dry.      Capillary Refill: Capillary refill takes less than 2 seconds.      Coloration: Skin is not pale.   Neurological:      Mental Status: He is alert and oriented to person, place, and time.          Diagnostic Studies   Laboratory:  N/A  Radiology:  N/A  Electrocardiogram:  N/A     Procedure Note   N/A     Pertinent History   The following portions of the  patient's history were reviewed and updated as appropriate: allergies, current medications, past family history, past medical history, past social history, past surgical history and problem list.    Patient has Acute gout of right knee, unspecified cause; Acute gout of right elbow, unspecified cause; and Elevated blood pressure reading without diagnosis of hypertension on their problem list.    Patient has a past medical history of Gout attack.    Patient has a past surgical history that includes Elbow surgery (Right, 2017).    Patient's family history includes Diabetes in his father; Gout in his father; Heart attack in his mother; Heart disease in his mother.    Patient reports that he has never smoked. He has never used smokeless tobacco. He reports that he drank alcohol. He reports that he does not use drugs.     Pipe Bush MD  [facility] [place]

## 2021-06-01 NOTE — TELEPHONE ENCOUNTER
Relay message below, also mention that pt is schedule to see PCP 09/12/19 pt stated will wait until then.

## 2021-06-02 NOTE — PATIENT INSTRUCTIONS - HE
Prednisone:  Take 40 mg twice a day (total 80 mg) for 2 days,   Then 40 mg in morning, 20 mg in afternoon (total 60 mg) for 3 days,   Then 40 mg daily and taper as previous

## 2021-06-02 NOTE — PROGRESS NOTES
"Subjective:      Patient ID: Daniele Rivera is a 39 y.o. male.    Chief Complaint:    HPI  Daniele Rivera is a 39 y.o. male form the [place] area that has relocated to [place] who presents today complaining of a history of gout or pseudogout.    He is here today ostensibly requesting pain medication for his condition.     He shares that he doesn't get an aspiration of the area because \"it may infect the joint.\"    He has had several flares. Review of the chart doesn't show any labs that have been drawn recently.    He was seen in our urgent care twice in September and by family practice on 9/12/2019 and referred to pain clinic and rheumatology. He was informed of last refill of oxycodone out of family practice and urgent care.    Patient is here ostensibly for medication management of his gout.      Past Medical History:   Diagnosis Date     Gout attack        Past Surgical History:   Procedure Laterality Date     ELBOW SURGERY Right 2017    gouty tophi       Family History   Problem Relation Age of Onset     Heart disease Mother      Heart attack Mother      Gout Father      Diabetes Father        Social History     Tobacco Use     Smoking status: Never Smoker     Smokeless tobacco: Never Used   Substance Use Topics     Alcohol use: Not Currently     Frequency: Never     Drug use: Never       Review of Systems  As above in HPI, otherwise balance of Review of Systems are negative.  No current fever chills or night sweats.  Objective:     BP (!) 172/111   Temp 98.9  F (37.2  C) (Oral)   Resp 16   Wt (!) 251 lb 12.8 oz (114.2 kg)   SpO2 98%   BMI 36.13 kg/m      Physical Exam  General: Patient is uncomfortable in the office  HEENT: Head is normocephalic atraumatic   eyes are PERRL EOMI sclera anicteric   Skin: Without rash nondiaphoretic  Musculoskeletal: right elbow with olecranon bursa swelling. Doesn't appear to be the joint.  He is guarding and doesn't allow ROM testing or " examination.        Assessment:     Procedures    The encounter diagnosis was Acute gouty arthritis.    Plan:     Patient was seen by rheumatology on 9/11 and noted to have recommended aspiration and possible steroid injection and MRI but the patient declined.  Today I recommended that we draw blood to look for a CBC, uric acid ESR and CRP and take an x-ray of the right elbow.  Patient declined this in the office.    Patient is citing that he just wants to be treated for what he has had in the past as gout.  Since he has been seen by primary care provider, rheumatology, and twice before in our urgent care I did not feel it appropriate to give narcotic medication.  This request was outlined by his primary care provider.  The patient also identified that he did not go to the pain clinic because of insurance reasons but he is here in the urgent care, presumably able to pay cash for an office visit.  I felt that a work-up to include aspiration of the bursa to look for gout or pseudogout x-ray and labs to confirm since there has been no recent labs in the chart to confirm uric acid levels.  Patient did not want to wait for further testing and left the office encounter before conclusion of the visit with the after visit summary and treatment was performed.  No labs were drawn because the patient left the office visit.

## 2021-06-02 NOTE — PROGRESS NOTES
She did not is been referred to the [facility] pain center.  Please see my note from 9/17, leaving a message with the patient that I saw the records he was referred to another pain clinic and did not want to duplicate referrals.    He is called with a new number [phone].  Reviewing the chart he had been seen in the emergency room, noted that he did not yet have insurance so has not gone to a pain center.  I have left a message again to review above.

## 2021-06-02 NOTE — PROGRESS NOTES
"ASSESSMENT/PLAN:  1. Acute gout, unspecified cause, unspecified site  oxyCODONE (ROXICODONE) 15 MG immediate release tablet       This is a 38 yo male with   1.  Acute gout - he is careful to tell me that he cannot be treated with traditional anti-inflammatories.  He has been maintained on prednisone and opiates.  We discussed that opiates are not the standard treatment for this condition.  He will need referral to the rheumatologist - as he is quite insistent that he has gout and pseudogout.  He doesn't feel he can work due to his current pain.  I will refill Oxycodone for only a few tablets.=  Return in about 4 weeks (around 11/20/2019) for Recheck.      Medications Discontinued During This Encounter   Medication Reason     predniSONE (DELTASONE) 20 MG tablet      Patient Instructions   Prednisone:  Take 40 mg twice a day (total 80 mg) for 2 days,   Then 40 mg in morning, 20 mg in afternoon (total 60 mg) for 3 days,   Then 40 mg daily and taper as previous      Chief Complaint:  Chief Complaint   Patient presents with     Gout       HPI:   Daniele Rivera is a 39 y.o. 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 -     In first couple years , \"crippling me\"  Had flare ups for a long time, then none for a couple years    Denies alcohol, smoking, denies eating red meats  Wanting it just to go away  Had aspiration of right elbow - was \"pseudogout\"    Has referral to pain clinic   Talked to them - they will send him his paperwork (hadn't gotten it before)    Takes ibuprofen instead of indomethacin   Taking prednisone 40 mg daily - on a taper -             PMH:   Patient Active Problem List    Diagnosis Date Noted     Pain of right upper extremity 09/11/2019     Acute gout of right knee, unspecified cause " 08/14/2019     Acute gout of right elbow, unspecified cause 08/14/2019     Elevated blood pressure reading without diagnosis of hypertension 08/14/2019     Past Medical History:   Diagnosis Date     Gout attack      Past Surgical History:   Procedure Laterality Date     ELBOW SURGERY Right 2017    gouty tophi     Social History     Socioeconomic History     Marital status:      Spouse name: Not on file     Number of children: Not on file     Years of education: Not on file     Highest education level: Not on file   Occupational History     Not on file   Social Needs     Financial resource strain: Not on file     Food insecurity:     Worry: Not on file     Inability: Not on file     Transportation needs:     Medical: Not on file     Non-medical: Not on file   Tobacco Use     Smoking status: Never Smoker     Smokeless tobacco: Never Used   Substance and Sexual Activity     Alcohol use: Not Currently     Frequency: Never     Drug use: Never     Sexual activity: Yes     Partners: Female   Lifestyle     Physical activity:     Days per week: Not on file     Minutes per session: Not on file     Stress: Not on file   Relationships     Social connections:     Talks on phone: Not on file     Gets together: Not on file     Attends Hinduism service: Not on file     Active member of club or organization: Not on file     Attends meetings of clubs or organizations: Not on file     Relationship status: Not on file     Intimate partner violence:     Fear of current or ex partner: Not on file     Emotionally abused: Not on file     Physically abused: Not on file     Forced sexual activity: Not on file   Other Topics Concern     Not on file   Social History Narrative     Not on file     Family History   Problem Relation Age of Onset     Heart disease Mother      Heart attack Mother      Gout Father      Diabetes Father        Meds:    Current Outpatient Medications:      ibuprofen (ADVIL,MOTRIN) 800 MG tablet, Take 1 tablet  "(800 mg total) by mouth every 8 (eight) hours as needed for pain., Disp: 21 tablet, Rfl: 2     gabapentin (NEURONTIN) 300 MG capsule, Two times a day one week then three times a day, Disp: 90 capsule, Rfl: 2     oxyCODONE (ROXICODONE) 15 MG immediate release tablet, Take 1 tablet (15 mg total) by mouth every 6 (six) hours as needed for pain., Disp: 10 tablet, Rfl: 0    Allergies:  No Known Allergies    ROS:  Pertinent positives as noted in HPI; otherwise 12 point ROS negative.      Physical Exam:  EXAM:  /88   Pulse 84   Temp 98.4  F (36.9  C) (Oral)   Resp 18   Ht 5' 10\" (1.778 m)   Wt (!) 252 lb (114.3 kg)   BMI 36.16 kg/m     Gen:  NAD, appears well, well-hydrated  HEENT:  TMs nl, oropharynx benign, nasal mucosa nl, conjunctiva clear  Neck:  Supple, no adenopathy, no thyromegaly, no carotid bruits, no JVD  Lungs:  Clear to auscultation bilaterally  Cor:  RRR no murmur  Abd:  Soft, nontender, BS+, no masses, no guarding or rebound, no HSM  Extr:  Mild swelling and tenderness, mostly right elbow  Neuro:  No asymmetry  Skin:  Warm/dry        Results:  No results found for this or any previous visit.          "

## 2021-06-03 VITALS
WEIGHT: 247 LBS | BODY MASS INDEX: 35.36 KG/M2 | HEART RATE: 100 BPM | HEIGHT: 70 IN | DIASTOLIC BLOOD PRESSURE: 96 MMHG | SYSTOLIC BLOOD PRESSURE: 160 MMHG

## 2021-06-03 VITALS
WEIGHT: 252 LBS | TEMPERATURE: 98.4 F | BODY MASS INDEX: 36.08 KG/M2 | DIASTOLIC BLOOD PRESSURE: 88 MMHG | RESPIRATION RATE: 18 BRPM | SYSTOLIC BLOOD PRESSURE: 162 MMHG | HEIGHT: 70 IN | HEART RATE: 84 BPM

## 2021-06-03 VITALS — WEIGHT: 236 LBS | BODY MASS INDEX: 33.86 KG/M2

## 2021-06-03 VITALS
BODY MASS INDEX: 37.22 KG/M2 | HEART RATE: 88 BPM | DIASTOLIC BLOOD PRESSURE: 82 MMHG | RESPIRATION RATE: 16 BRPM | HEIGHT: 70 IN | SYSTOLIC BLOOD PRESSURE: 158 MMHG | WEIGHT: 260 LBS

## 2021-06-03 VITALS
OXYGEN SATURATION: 100 % | TEMPERATURE: 98.6 F | HEART RATE: 84 BPM | BODY MASS INDEX: 34.44 KG/M2 | SYSTOLIC BLOOD PRESSURE: 188 MMHG | RESPIRATION RATE: 16 BRPM | WEIGHT: 240 LBS | DIASTOLIC BLOOD PRESSURE: 108 MMHG

## 2021-06-03 VITALS
OXYGEN SATURATION: 97 % | BODY MASS INDEX: 35.37 KG/M2 | SYSTOLIC BLOOD PRESSURE: 152 MMHG | WEIGHT: 246.5 LBS | TEMPERATURE: 98.7 F | DIASTOLIC BLOOD PRESSURE: 94 MMHG | HEART RATE: 87 BPM

## 2021-06-03 VITALS — HEIGHT: 70 IN | WEIGHT: 236 LBS | BODY MASS INDEX: 33.79 KG/M2

## 2021-06-03 VITALS
BODY MASS INDEX: 34.36 KG/M2 | SYSTOLIC BLOOD PRESSURE: 142 MMHG | HEIGHT: 70 IN | WEIGHT: 240 LBS | DIASTOLIC BLOOD PRESSURE: 100 MMHG | HEART RATE: 88 BPM

## 2021-06-03 VITALS — WEIGHT: 242 LBS | BODY MASS INDEX: 34.72 KG/M2

## 2021-06-03 VITALS
DIASTOLIC BLOOD PRESSURE: 105 MMHG | SYSTOLIC BLOOD PRESSURE: 153 MMHG | BODY MASS INDEX: 34.18 KG/M2 | HEART RATE: 86 BPM | OXYGEN SATURATION: 98 % | WEIGHT: 238.19 LBS | RESPIRATION RATE: 20 BRPM | TEMPERATURE: 99 F

## 2021-06-03 VITALS — WEIGHT: 240.2 LBS | BODY MASS INDEX: 34.47 KG/M2

## 2021-06-03 VITALS
RESPIRATION RATE: 16 BRPM | SYSTOLIC BLOOD PRESSURE: 172 MMHG | WEIGHT: 251.8 LBS | BODY MASS INDEX: 36.13 KG/M2 | TEMPERATURE: 98.9 F | DIASTOLIC BLOOD PRESSURE: 111 MMHG | OXYGEN SATURATION: 98 %

## 2021-06-03 NOTE — PROGRESS NOTES
"  Subjective:      Daniele Rivera is a 39 y.o. male who presents for evaluation of reported flare of gout and pain.  Patient has been seen numerous times for gout.  Always associated with severe pain, always requesting oxycodone.  Last visit was with Dr. Grant on 10/23/2019.  I reviewed that note today.  She prescribed him minimal oxycodone and asked him to follow-up with PCP and specialist.  He says today that she \"only gave me 10 pills\" and that is not enough.  He is in a lot of pain, especially his hands.  He saw Dr. Kathleen for consult at rheumatology on 9/11/2019.  I reviewed that today.  Dr. Kathleen thought diagnosis could be gout, but wanted to rule out other possibilities.  Patient declined aspiration of his elbow joint and other follow-up.  From his office note:  \"One observation which is important to note here is the marked discrepancy between the objective findings of inflammation both on examination and also no definite ultrasonographic findings of hyperemia on examination of the PIPs where he complains of exquisite pain to the point where he was very reluctant for me to properly examine his right hand.\"  Patient refused MRI of right hand as recommended by Dr. Kathleen, in part due to \"insurance reasons.\"  He requested pain medicine from Dr. Kathleen, who did not prescribe it.  He asked patient to follow-up in 4 weeks.  Patient did not follow-up with him, in part because he did not agree with Dr. Kathleen's diagnosis and treatment plan.    When I saw him for his first visit on 8/14/2019, he stated he was in the progress of getting insurance.  I had put in a referral for someone from our clinic to check in to see if he needed help getting insurance.  He never returned her phone calls.  At our first visit he stated he thought he would have insurance by the end of the month (August).  Today, he says he still does not have insurance, because he has not been getting calls back from the state to finalize " things.  he again states he does not need any help with his insurance.  His statements regarding his previous visits with rheumatology and with our doctors at the clinic do not correlate with providers' office notes.    He has seen Dr. Greenberg (PCP) a few times for his gout and pain.  Last visit was on 9/12/2019.  I reviewed that note today.  Plan states: Referred to pain clinic.   Told this rx for oxycodone was last from here as there is no indication for ongoing use that is apparent during the last several evaluations.      Patient Active Problem List   Diagnosis     Acute gout of right knee, unspecified cause     Acute gout of right elbow, unspecified cause     Elevated blood pressure reading without diagnosis of hypertension     Pain of right upper extremity       Current Outpatient Medications:      gabapentin (NEURONTIN) 300 MG capsule, Two times a day one week then three times a day, Disp: 90 capsule, Rfl: 2     ibuprofen (ADVIL,MOTRIN) 800 MG tablet, Take 1 tablet (800 mg total) by mouth every 8 (eight) hours as needed for pain., Disp: 21 tablet, Rfl: 2     oxyCODONE (ROXICODONE) 15 MG immediate release tablet, Take 1 tablet (15 mg total) by mouth every 6 (six) hours as needed for pain., Disp: 10 tablet, Rfl: 0     Objective:     Allergies:  Patient has no known allergies.    Vitals:  Vitals:    11/06/19 1527   BP: 158/82   Pulse:    Resp:      Body mass index is 37.31 kg/m .    Vital signs reviewed.  General: Patient is alert and oriented x 3, in no apparent distress  Patient declined exam    Assessment and Plan:   1.  Reported gout and hand pain.  Patient repeatedly requested narcotic pain medicine today.  Prescription monitoring report run on patient,.  No prescriptions not accounted for by visits recorded in EHR.  Patient has given conflicting accounts regarding getting insurance.  His lack of insurance has been the reason he gives for not wanting to do any further studies (bloodwork, MRI, etc.)  He has not  followed up with rheumatology, as directed on multiple different occasions.  He has declined other interventions as recommended by rheumatology.  He was also referred to pain clinic.  He also reports issues getting in contact with the pain clinic.  I reviewed pain clinic notes in chart today.  Patient reports that he has not received call back from pain clinic.  It looks like at one point our pain clinic reported he was going to another pain clinic. (?)  He does not have a future visit scheduled with Pain Clinic or Rheumatology, as viewed in our EHR.    Last note with PCP on 9/12/2019 stated that provider/clinic would no longer be prescribing him oxycodone for this complaint.  Patient gives very different accounts of visits with rheumatology and with PCP.  I reviewed specific points in these notes with patient, and he says these are not true.  He states that gabapentin and ibuprofen are not helpful for his pain.  I reviewed with him that I would not be prescribing him narcotic pain medicine today.  He was quite upset with this.  I suggested he follow-up with pain clinic and rheumatology.  [facility].  Referral placed today.    2.  Elevated blood pressure.  BP Readings from Last 3 Encounters:   11/06/19 158/82   10/23/19 162/88   10/20/19 (!) 177/106     I reviewed his blood pressures have been elevated at virtually every visit.  He states this is because he always comes in when he is in pain.  He says when he is not in pain he does not have high blood pressure.  I recommended further evaluation of his blood pressure, and to consider starting a hypertensive medication to treat his blood pressure.  He refuses this today.  He declines a flu shot.    This dictation uses voice recognition software, which may contain typographical errors.

## 2021-06-04 VITALS
SYSTOLIC BLOOD PRESSURE: 165 MMHG | TEMPERATURE: 98.9 F | BODY MASS INDEX: 33.72 KG/M2 | HEART RATE: 90 BPM | RESPIRATION RATE: 16 BRPM | DIASTOLIC BLOOD PRESSURE: 100 MMHG | OXYGEN SATURATION: 99 % | WEIGHT: 235 LBS

## 2021-06-05 NOTE — TELEPHONE ENCOUNTER
Pt scheduled with Dr. Cortez on 1/13/20 and was a former pt of Dr. Kathleen.    If pt want to [facility], appt need to be a 30 min slot.

## 2021-06-08 NOTE — PROGRESS NOTES
"Daniele Rivera is a 40 y.o. male who is being evaluated via a billable telephone visit.      The patient has been notified of following:     \"This telephone visit will be conducted via a call between you and your physician/provider. We have found that certain health care needs can be provided without the need for a physical exam.  This service lets us provide the care you need with a short phone conversation.  If a prescription is necessary we can send it directly to your pharmacy.  If lab work is needed we can place an order for that and you can then stop by our lab to have the test done at a later time.    Telephone visits are billed at different rates depending on your insurance coverage. During this emergency period, for some insurers they may be billed the same as an in-person visit.  Please reach out to your insurance provider with any questions.    If during the course of the call the physician/provider feels a telephone visit is not appropriate, you will not be charged for this service.\"    Patient has given verbal consent to a Telephone visit? Yes    What phone number would you like to be contacted at? [phone]     Patient would like to receive their AVS by AVS Preference: Mail a copy.    Additional provider notes: .    Assessment/Plan:  .      Phone call duration:  22 minutes    Bernardo BragaPANCHO     10:30 AM  Gout acting up  No flare for months  Now right ankle swelled.  2 days ago.   Puffy swelled burn ankle  The ankle bone.    Hx steroid indocin and pain med.      Trying to do the diet thing to prevent.    Trying to lose wt and eat healthy and thinks a herb may have flared    ibu 800 two tried the first day.   Takes the swelling down a little bit.    Has not had a flare for months       In past, the flare would be elbow and knees and toes.    Says the last time he got prescriptions for this was December of 2019    [facility]  Last august      Did see rheumatology last " September.  Pt declined suggestions.    Rheum note reviewed.   Findings inconsistent with dx.    Pt states the visit was not good.  Does not want to go back to see that provider as dx was suspect.    Pt states had insurance issues previously and can get labs not    During ordering pt negotiating for high dose oxycodone and more pills    Pt did not acknowledge that he received narcotics and was just seen a few days ago.  Specifically denying any prescribed drugs given since December last year.  On three separate questions during conversation  ASSESSMENT/PLAN:    Subjective polyarthralgia.  Objective paucity of findings  Hx dx gout but has avoided recent labs and prior records not forthcoming     Requesting narcotics and negotiating for higher dose pill and more pills    Asked to return to the rheumatologist for ongoing workup and to clarify dx/   Pt states will contact them for follow up      Labs ordered June 16    Pt Did not acknowledge that pt was seen two weeks ago in ED where he was prescribed meds and narcotics.  Specifically stating that last prescribed meds and narcotic were December of 2019    1. Polyarthralgia  Uric Acid    HM2(CBC w/o Differential)    Erythrocyte Sedimentation Rate    C-Reactive Protein(CRP)    Antinuclear Antibody (VIOLETTA) Cascade    Rheumatoid Factor Quant    oxyCODONE (ROXICODONE) 5 MG immediate release tablet    ibuprofen (ADVIL,MOTRIN) 800 MG tablet    predniSONE (DELTASONE) 20 MG tablet   2. Right elbow pain  oxyCODONE (ROXICODONE) 5 MG immediate release tablet   3. Gout of right elbow, unspecified cause, unspecified chronicity  ibuprofen (ADVIL,MOTRIN) 800 MG tablet     Problem number of new, unstable, or uncontrolled problems above (others are stable):  Chronic issues stable/ same treatment  Current Outpatient Medications on File Prior to Visit   Medication Sig Dispense Refill     gabapentin (NEURONTIN) 300 MG capsule Two times a day one week then three times a day 90 capsule 2      ibuprofen (ADVIL,MOTRIN) 800 MG tablet Take 1 tablet (800 mg total) by mouth every 8 (eight) hours as needed for pain. 21 tablet 0     [DISCONTINUED] ibuprofen (ADVIL,MOTRIN) 800 MG tablet Take 1 tablet (800 mg total) by mouth every 8 (eight) hours as needed for pain. 21 tablet 2     [DISCONTINUED] oxyCODONE (ROXICODONE) 5 MG immediate release tablet Take 1 tablet (5 mg total) by mouth every 6 (six) hours as needed (breakthrough pain). 12 tablet 0     No current facility-administered medications on file prior to visit.                   10:52 AM

## 2021-06-08 NOTE — PROGRESS NOTES
"Daniele Rivera is a 40 y.o. male who is being evaluated via a billable telephone visit.      The patient has been notified of following:     \"This telephone visit will be conducted via a call between you and your physician/provider. We have found that certain health care needs can be provided without the need for a physical exam.  This service lets us provide the care you need with a short phone conversation.  If a prescription is necessary we can send it directly to your pharmacy.  If lab work is needed we can place an order for that and you can then stop by our lab to have the test done at a later time.    Telephone visits are billed at different rates depending on your insurance coverage. During this emergency period, for some insurers they may be billed the same as an in-person visit.  Please reach out to your insurance provider with any questions.    If during the course of the call the physician/provider feels a telephone visit is not appropriate, you will not be charged for this service.\"    Patient has given verbal consent to a Telephone visit? Yes    What phone number would you like to be contacted at? [phone]    Patient would like to receive their AVS by AVS Preference: Mail a copy.    Additional provider notes:   Subjective  [age] year old male calls with concerns of pain.   Telephone visit completed due to current pandemic of covid 19.   He notes long history of gout.   He thinks he is having a gouty flare up.   The pain started a week ago.   He has been seen multiple times for this, though declines bloodwork or imaging. He says he knows he has gout.   His right ankle and left knee are hurting. It is very painful. Has tried ibuprofen, 800 mg at a time. It does not help lately. He says he has been calling to get in with rheumatology, he has an appointment at an [facility] in a couple weeks, but says a nurse at the office is trying to get him in the next couple days. He reports he has seen " another rheumatologist before, he did not agree with his care because imaging was recommended. He said he was upset because he 'does not need to check for broken bones'. We discussed natural history of gout, usual evaluation and treatment.  He insists blood work is not needed though he does plan to come in later for blood work. EHR was reviewed. He was seen by his PCP six days ago and blood work was ordered. Was treated with some oxycodone, he does not think it was enough. He requests more oxycodone because nothing else works for his pain.   He is upset with me, that there is so much questioning. He is upset that no one will treat his pain. He believes we do not trust him and within a few minutes of discussion, he is not willing to discuss his care anymore, he hangs up on me.      ROS: 12 systems reviewed, all negative except for what is mentioned in HPI.     Past Medical History:   Diagnosis Date     Gout attack      Patient Active Problem List   Diagnosis     Acute gout of right knee, unspecified cause     Acute gout of right elbow, unspecified cause     Elevated blood pressure reading without diagnosis of hypertension     Pain of right upper extremity     Past Surgical History:   Procedure Laterality Date     ELBOW SURGERY Right 2017    gouty tophi     Family History   Problem Relation Age of Onset     Heart disease Mother      Heart attack Mother      Gout Father      Diabetes Father      Social History     Socioeconomic History     Marital status:      Spouse name: Not on file     Number of children: Not on file     Years of education: Not on file     Highest education level: Not on file   Occupational History     Not on file   Social Needs     Financial resource strain: Not on file     Food insecurity     Worry: Not on file     Inability: Not on file     Transportation needs     Medical: Not on file     Non-medical: Not on file   Tobacco Use     Smoking status: Never Smoker     Smokeless tobacco: Never  Used   Substance and Sexual Activity     Alcohol use: Not Currently     Frequency: Never     Drug use: Never     Sexual activity: Yes     Partners: Female   Lifestyle     Physical activity     Days per week: Not on file     Minutes per session: Not on file     Stress: Not on file   Relationships     Social connections     Talks on phone: Not on file     Gets together: Not on file     Attends Religion service: Not on file     Active member of club or organization: Not on file     Attends meetings of clubs or organizations: Not on file     Relationship status: Not on file     Intimate partner violence     Fear of current or ex partner: Not on file     Emotionally abused: Not on file     Physically abused: Not on file     Forced sexual activity: Not on file   Other Topics Concern     Not on file   Social History Narrative     Not on file     Current Outpatient Medications on File Prior to Visit   Medication Sig Dispense Refill     gabapentin (NEURONTIN) 300 MG capsule Two times a day one week then three times a day 90 capsule 2     ibuprofen (ADVIL,MOTRIN) 800 MG tablet Take 1 tablet (800 mg total) by mouth every 8 (eight) hours as needed for pain. 21 tablet 0     oxyCODONE (ROXICODONE) 5 MG immediate release tablet Take 1 tablet (5 mg total) by mouth every 6 (six) hours as needed (breakthrough pain). 12 tablet 0     ibuprofen (ADVIL,MOTRIN) 800 MG tablet Take 1 tablet (800 mg total) by mouth every 8 (eight) hours as needed for pain. 90 tablet 0     No current facility-administered medications on file prior to visit.              Assessment/Plan:  1. Polyarthralgia  2. Acute gout, unspecified cause, unspecified site  Unclear etiology.   Unable to continue discussion on this matter since patient hung on me after I detailed the usual treatment of gout. He insisted blood work and imaging was not needed and insisted I did not believe he was in pain. I did reassure patient that I believe his has pain, but that I prefer to  follow the usual course of treatment rather than just refill oxycodone.   Before the encounter ended I had encouraged patient to keep his scheduled appointment with rheumatology.         Phone call duration:  7 minutes    Nneka Marc MD

## 2021-06-10 NOTE — PROGRESS NOTES
Chief Complaint   Patient presents with     Gout     x2d, R elbow       ASSESSMENT & PLAN:   Diagnoses and all orders for this visit:    Right elbow pain  -     predniSONE (DELTASONE) 10 mg tablet; Take 40mg x 3 days, then 30mg x 3 days, then 20mg x 3 days, then 10mg x 3 days.  Dispense: 30 tablet; Refill: 0  -     Ambulatory referral to Pain Clinic        MDM:  Patient informed immediately I would not be prescribing any opiates for his gout as he has not had his labs checked nor done imaging as recommended after multiple requests.  Patient says he is okay with prednisone.  12-day taper.    Patient referred again to the pain clinic, states he is not being seen another pain clinic at this time.    Elbow is tender and he is having difficulty extending it.      States he has no insurance and that does not want to purchase a plan due to cost.    Patient left prior to receiving discharge instructions.    Supportive care discussed.  See discharge instructions below for specific recommendations given.    At the end of the encounter, I discussed results, diagnosis, medications. Discussed red flags for immediate return to clinic/ER, as well as indications for follow up if no improvement. Patient and/or caregiver understood and agreed to plan. Patient was stable for discharge.    SUBJECTIVE    HPI:  HPI  Daniele Rivera presents to the walk-in clinic with   Chief Complaint   Patient presents with     Gout     x2d, R elbow     Stating he is having a rt elbow gout flare up.  Right elbow is tender and he is having difficulty extending it fully.    Please see recent visits for same starting May 25 in the ER where he was prescribed oxycodone.  Had 2 virtual visits following that in which he refused labs and imaging and asked for opiates to treat his pain.      Had previously been referred to pain clinic for same.  States he is not currently seeing anyone with any pain clinic.    When mentioning that he had hung up on virtual visit  provider in association with discussion regarding opiates, patient denies this.      See ROS for additional symptoms and/or pertinent negatives.       History obtained from the patient.    Past Medical History:   Diagnosis Date     Gout attack        Active Ambulatory (Non-Hospital) Problems    Diagnosis     Pain of right upper extremity     Acute gout of right knee, unspecified cause     Acute gout of right elbow, unspecified cause     Elevated blood pressure reading without diagnosis of hypertension       Family History   Problem Relation Age of Onset     Heart disease Mother      Heart attack Mother      Gout Father      Diabetes Father        Social History     Tobacco Use     Smoking status: Never Smoker     Smokeless tobacco: Never Used   Substance Use Topics     Alcohol use: Not Currently     Frequency: Never       Review of Systems   All other systems reviewed and are negative.      OBJECTIVE    Vitals:    08/19/20 1548   BP: (!) 165/100   Patient Site: Left Arm   Patient Position: Sitting   Cuff Size: Adult Regular   Pulse: 90   Resp: 16   Temp: 98.9  F (37.2  C)   TempSrc: Oral   SpO2: 99%   Weight: (!) 235 lb (106.6 kg)       Physical Exam  Constitutional:       General: He is not in acute distress.     Appearance: He is well-developed.   HENT:      Right Ear: External ear normal.      Left Ear: External ear normal.   Eyes:      General:         Right eye: No discharge.         Left eye: No discharge.      Conjunctiva/sclera: Conjunctivae normal.   Cardiovascular:      Pulses: Normal pulses.   Pulmonary:      Effort: Pulmonary effort is normal.   Musculoskeletal:         General: Tenderness (rt elbow, no warmth nor erythema ) present.      Comments: Unable to fully extend rt elbow.    Skin:     General: Skin is warm and dry.      Capillary Refill: Capillary refill takes less than 2 seconds.   Neurological:      Mental Status: He is alert and oriented to person, place, and time.   Psychiatric:         Mood  and Affect: Mood normal.         Behavior: Behavior normal.         Thought Content: Thought content normal.         Judgment: Judgment normal.         Labs:  No results found for this or any previous visit (from the past 240 hour(s)).      Radiology:    No results found.    PATIENT INSTRUCTIONS:   Patient Instructions   12 day steroid taper  Ice to rt elbow     Pain clinic referral for [facility].  You can see them as soon as you're able, but they will likely want to order labs/imaging and so on.

## 2021-06-10 NOTE — PATIENT INSTRUCTIONS - HE
12 day steroid taper  Ice to rt elbow     Pain clinic referral for [facility].  You can see them as soon as you're able, but they will likely want to order labs/imaging and so on.

## 2021-06-16 PROBLEM — M79.601 PAIN OF RIGHT UPPER EXTREMITY: Status: ACTIVE | Noted: 2019-09-11

## 2021-06-16 PROBLEM — M10.9: Status: ACTIVE | Noted: 2019-08-14

## 2021-06-16 PROBLEM — R03.0 ELEVATED BLOOD PRESSURE READING WITHOUT DIAGNOSIS OF HYPERTENSION: Status: ACTIVE | Noted: 2019-08-14

## 2021-06-16 PROBLEM — M10.9 ACUTE GOUT OF RIGHT KNEE, UNSPECIFIED CAUSE: Status: ACTIVE | Noted: 2019-08-14

## 2021-06-17 NOTE — PATIENT INSTRUCTIONS - HE
Patient Instructions by Hugo Camp PA-C at 10/20/2019  1:50 PM     Author: Hugo Camp PA-C Service: -- Author Type: Physician Assistant    Filed: 10/20/2019  3:00 PM Encounter Date: 10/20/2019 Status: Addendum    : Hugo Camp PA-C (Physician Assistant)    Related Notes: Original Note by Hugo Camp PA-C (Physician Assistant) filed at 10/20/2019  2:59 PM       Take the prednisone as written.  Aloe up with pain clinic or primary care provider or rheumatology for reevaluation.      Patient Education     What Is Gout?  Gout is a disease that affects the joints. Left untreated, it can lead to painful foot and joint deformities and even kidney problems. But, by treating gout early, you can relieve pain and help prevent future problems. Gout can usually be treated with medicine and proper diet. In severe cases, surgery may be needed.  What causes gout?  Gout is caused by an excess of uric acid (a waste product made by the body). Uric acid is excreted by the kidneys. If the uric acid level in your blood rises too high, the uric acid may form crystals that collect in the joints, bringing on a gout attack. If you have many gout attacks, crystals may form large deposits called tophi. Tophi can damage joints and cause deformity.  Who is at risk for gout?  Men are more likely to have gout than women. But women can also be affected, mostly after menopause. Some health problems, such as obesity and high cholesterol, make gout more likely. And some medicines, such as diuretics (water pills), can trigger a gout attack. People who drink a lot of alcohol are at high risk for gout. Certain foods can also trigger a gout attack.  Substances that may trigger a gout attack  To help prevent a gout attack, avoid these foods:    Alcohol (particularly beer, but also red wine and spirits)    Certain meats (red meat, processed meat, turkey)    Organ meats (kidney, liver, sweetbread)    Shellfish (lobster, crab, shrimp, scallop,  "mussel)    Certain fish (anchovy, sardine, herring, mackerel)   Treatment    Lifestyle changes, including weight loss, exercise, and quitting tobacco use    Reducing consumption of the food groups above as well as high fructose corn syrup, found in many foods including sodas and energy drinks    Changing non-essential medicines that may contribute to gout (such as thiazide diuretics--\"water pills\")    Medicines to reduce the amount of uric acid in the blood, such as allopurinol, probencid, febuxostat, and lesinurad.    Medicines to treat acute gout attacks, including NSAIDs (nonsteroidal anti-inflammatory medicines), steroids, and colchicine    Date Last Reviewed: 4/1/2018 2000-2019 [employer]. [address] [place] [address], [place] [address]. All rights reserved. This information is not intended as a substitute for professional medical care. Always follow your healthcare professional's instructions.           Patient Education     Eating to Prevent Gout  Gout is a painful form of arthritis caused by an excess of uric acid. This is a waste product made by the body. It builds up in the body and forms crystals that collect in the joints, causing a gout attack. Alcohol and certain foods can trigger a gout attack. Below are some guidelines for changing your diet to help you manage gout. Your healthcare provider can work with you to determine the best eating plan for you. Know that diet is only one part of managing gout. Take your medicines as prescribed and follow the other guidelines your healthcare provider has given you.  Foods to limit  Eating too many foods containing purines may increase the levels of uric acid in your body and increase your risk for a gout attack. It may be best to limit these high-purine foods:    Alcohol (beer and red wine). You may be told to avoid alcohol completely.    Certain fish (anchovies, sardines, fish roes, herring, tuna, mussels, codfish, scallops, trout, and " arnulfo)    Certain meats (red meat, processed meat, mc, turkey, wild game, and goose)    Sauces and gravies made with meat    Organ meats (such as liver, kidneys, sweetbreads, and tripe)    Legumes (such as dried beans and peas)    Mushrooms, spinach, asparagus, and cauliflower    Yeast and yeast extract supplements  Foods to try  Some foods may be helpful for people with gout. You may want to try adding some of the following foods to your diet:    Dark berries. These include blueberries, blackberries, and cherries. These berries contain chemicals that may lower uric acid.    Tofu. Tofu, which is made from soy, is a good source of protein. Studies have shown that it may be a better choice than meat for people with gout.    Omega fatty acids. These acids are found in fatty fish (such as salmon), certain oils (such as flax, olive, or nut oils), or nuts. They may help prevent inflammation due to gout.  The following guidelines are recommended by the American Medical Association for people with gout. Your diet should be:    High in fiber, whole grains, fruits, and vegetables.    Low in protein (15% of calories should come from protein. Choose lean sources, such as soy, lean meats, and poultry).    Low in fat (no more than 30% of calories should come from fat, with only 10% coming from animal, or saturated, fat).  Date Last Reviewed: 11/1/2017 2000-2019 [employer]. [address] [place] [address]. All rights reserved. This information is not intended as a substitute for professional medical care. Always follow your healthcare professional's instructions.           Patient Education     Treating Gout Attacks     Raising the joint above the level of your heart can help reduce gout symptoms.     Gout is a disease that affects the joints. It is caused by excess uric acid in your blood that may lead to crystals forming in your joints. Left untreated, it can lead to painful foot and joint  deformities and even kidney problems. But, by treating gout early, you can relieve pain and help prevent future problems. Gout can usually be treated with medicine and proper diet. In severe cases, surgery may be needed.  Gout attacks are painful and often happen more than once. Taking medicines may reduce pain and prevent attacks in the future. There are also some things you can do at home to relieve symptoms.  Medicines for gout  Your healthcare provider may prescribe a daily medicine to reduce levels of uric acid. Reducing your uric acid levels may help prevent gout attacks. Allopurinol is one commonly used medicine taken daily to reduce uric acid levels. Other daily medicines used to reduce uric acid levels include febuxostat, lesinurad, and probencid. Other medicines can help relieve pain and swelling during an acute attack. Medicines such as NSAIDs (nonsteroidal anti-inflammatory medicines), steroids, and colchicine may be prescribed for intermittent use to relieve an acute gout attack. Be sure to take your medicine as directed.  What you can do  Below are some things you can do at home to relieve gout symptoms. Your healthcare provider may have other tips.    Rest the painful joint as much as you can.    Raise the painful joint so it is at a level higher than your heart.    Use ice for 10 minutes every 1 to 2 hours as possible.  How can I prevent gout?  With a little effort, you may be able to prevent gout attacks in the future. Here are some things you can do:    Don't eat foods high in purines  ? Certain meats (red meat, processed meat, turkey)  ? Organ meats (kidney, liver, sweetbread)  ? Shellfish (lobster, crab, shrimp, scallop, mussel)  ? Certain fish (anchovy, sardine, herring, mackerel)    Take any medicines prescribed by your healthcare provider.    Lose weight if you need to.    Reduce high fructose corn syrup in meals and drinks.    Reduce or cut out alcohol, particularly beer, but also red wine  and spirits.    Control blood pressure, diabetes, and cholesterol.    Drink plenty of water to help flush uric acid from your body.  Date Last Reviewed: 4/1/2018 2000-2019 [employer]. [address] [place] [address], [place] [address]. All rights reserved. This information is not intended as a substitute for professional medical care. Always follow your healthcare professional's instructions.

## 2021-06-17 NOTE — PATIENT INSTRUCTIONS - HE
Patient Instructions by Maurizio Welch DO at 9/2/2019  2:10 PM     Author: Maurizio Welch DO Service: -- Author Type: Physician    Filed: 9/2/2019  2:59 PM Encounter Date: 9/2/2019 Status: Signed    : Maurizio Welch DO (Physician)       I will shred the first two printed narcotic prescriptions. Fill the one given for 28 tablets. Caution that the oxycodone may cause drowsiness and constipation, and can lead to psychological addiction in some people. There is also acetaminophen in this medication, so do not take other products containing this when using the prescription.      Patient Education     Gout Diet  Gout is a painful condition caused by an excess of uric acid, a waste product made by the body. Uric acid forms crystals that collect in the joints. The immune response to these crystals brings on symptoms of joint pain and swelling. This is called a gout attack. Often, medications and diet changes are combined to manage gout. Below are some guidelines for changing your diet to help you manage gout and prevent attacks. Your health care provider will help you determine the best eating plan for you.     Eating to manage gout  Weight loss for those who are overweight may help reduce gout attacks.  Eat less of these foods  Eating too many foods containing purines may raise the levels of uric acid in your body. This raises your risk for a gout attack. Try to limit these foods and drinks:    Alcohol, such as beer and red wine. You may be told to avoid alcohol completely.    Soft drinks that contain sugar or high fructose corn syrup    Certain fish, including anchovies, sardines, fish eggs, and herring    Shellfish    Certain meats, such as red meat, hot dogs, luncheon meats, and turkey    Organ meats, such as liver, kidneys, and sweetbreads    Legumes, such as dried beans and peas    Other high fat foods such as gravy, whole milk, and high fat cheeses    Vegetables such as asparagus, cauliflower, spinach, and  mushrooms used to be thought to contribute to an increased risk for a gout attack, but recent studies show that high purine vegetables don't increase the risk for a gout attack.  Eat more of these foods  Other foods may be helpful for people with gout. Add some of these foods to your diet:    Cherries contain chemicals that may lower uric acid.    Omega fatty acids. These are found in some fatty fish such as salmon, certain oils (flax, olive, or nut), and nuts themselves. Omega fatty acids may help prevent inflammation due to gout.    Dairy products that are low-fat or fat-free, such as cheese and yogurt    Complex carbohydrate foods, including whole grains, brown rice, oats, and beans    Coffee, in moderation    Water, approximately 64 ounces per day  Follow-up care  Follow up with your healthcare provider as advised.  When to seek medical advice  Call your healthcare provider right away if any of these occur:    Return of gout symptoms, usually at night:    Severe pain, swelling, and heat in a joint, especially the base of the big toe    Affected joint is hard to move    Skin of the affected joint is purple or red    Fever of 100.4 F (38 C) or higher    Pain that doesn't get better even with prescribed medicine   Date Last Reviewed: 1/12/2016 2000-2017 [employer]. [address] [place] [address], [place] [address]. All rights reserved. This information is not intended as a substitute for professional medical care. Always follow your healthcare professional's instructions.           Patient Education     Gout    Gout is an inflammation of a joint due to a build-up of gout crystals in the joint fluid. This occurs when there is an excess of uric acid (a normal waste product) in the body. Uric acid builds up in the body when the kidneys are unable to filter enough of it from the blood. This may occur with age. It is also associated with kidney disease. Gout occurs more often in people with obesity, diabetes,  high blood pressure, or high levels of fats in the blood. It may run in families. Gout tends to come and go. A flare up of gout is called an attack. Drinking alcohol or eating certain foods (such as shellfish or foods with additives such as high-fructose corn syrup) may increase uric acid levels in the blood and cause a gout attack.  During a gout attack, the affected joint may become a hot, red, swollen and painful. If you have had one attack of gout, you are likely to have another. An attack of gout can be treated with medicine. If these attacks become frequent, a daily medicine may be prescribed to help the kidneys remove uric acid from the body.  Home care  During a gout attack:    Rest painful joints. If gout affects the joints of your foot or leg, you may want to use crutches for the first few days to keep from bearing weight on the affected joint.    When sitting or lying down, raise the painful joint to a level higher than your heart.    Apply an ice pack (ice cubes in a plastic bag wrapped in a thin towel) over the injured area for 20 minutes every 1 to 2 hours the first day for pain relief. Continue this 3 to 4 times a day for swelling and pain.    Avoid alcohol and foods listed below (see Preventing attacks) during a gout attack. Drink extra fluid to help flush the uric acid through your kidneys.    If you were prescribed a medicine to treat gout, take it as your healthcare provider has instructed. Don't skip doses.    Take anti-inflammatory medicine as directed.     If pain medicines have been prescribed, take them exactly as directed.    Preventing attacks    Minimize or avoid alcohol use. Excess alcohol intake can cause a gout attack.    Limit these foods and beverages:  ? Organ meats, such as kidneys and liver  ? Certain seafoods (anchovies, sardines, shrimp, scallops, herring, mackerel)  ? Wild game, meat extracts and meat gravies  ? Foods and beverages sweetened with high-fructose corn syrup, such  as sodas    Eat a healthy diet including low-fat and nonfat dairy, whole grains, and vegetables.    If you are overweight, talk to your healthcare provider about a weight reduction plan. Avoid fasting or extreme low calorie diets (less than 900 calories per day). This will increase uric acid levels in the body.    If you have diabetes or high blood pressure, work with your doctor to manage these conditions.    Protect the joint from injury. Trauma can trigger a gout attack.  Follow-up care  Follow up with your healthcare provider, or as advised.   When to seek medical advice  Call your healthcare provider if you have any of the following:    Fever over 100.4 F (38. C) with worsening joint pain    Increasing redness around the joint    Pain developing in another joint    Repeated vomiting, abdominal pain, or blood in the vomit or stool (black or red color)  Date Last Reviewed: 3/1/2017    7189-2893 [employer]. [address] [place] [address], [place] [address]. All rights reserved. This information is not intended as a substitute for professional medical care. Always follow your healthcare professional's instructions.

## 2021-06-19 NOTE — LETTER
Letter by Yvonne Ruiz CHW at      Author: Yvonne Ruiz CHW Service: -- Author Type: --    Filed:  Encounter Date: 8/15/2019 Status: (Other)         Dear Daniele,                                                                         You were recently referred to the [facility]'s Clinic Care Coordination service.  This is a service offered through your Primary Care Clinic which can help you access resources, services in regard to your health and well-being goals. The clinic Community Health Worker has placed two calls to you to discuss the nature of this service and to offer enrollment.  If you are interested in learning more about Clinic Care Coordination, please call your Primary Care Clinic's Community Health Worker, Burt, at [phone].                                                    Sincerely,                                                                                    RENATE Coulter                                                                                          Clinic Care Coordination                                                  [facility]                                Phone: [phone]

## 2021-06-27 NOTE — PROGRESS NOTES
"Progress Notes by Maurizio Welch DO at 9/2/2019  2:10 PM     Author: Maurizio Welch DO Service: -- Author Type: Physician    Filed: 9/2/2019  4:53 PM Encounter Date: 9/2/2019 Status: Addendum    : Maurizio Welch DO (Physician)    Related Notes: Original Note by Maurizio Welch DO (Physician) filed at 9/2/2019  3:07 PM       Chief Complaint   Patient presents with   ? Arm swollen     right arm swollen, pt states has a history of gout.        History of Present Illness: Rooming staff notes reviewed. Patient will be seeing a specialist on 09/09/2019 to address the swelling and tophi in right elbow related to his gout. He is currently taking prednisone. He is out of his \"pain meds\". He is on the end of a prednisone taper, currently taking 10 mg/day. The gout condition seemed to get worse after he went from 20 mg to 10 mg/day, with dose reduction about 3 days ago. He dislikes colchicine due to GI upset. He is already taking ibuprofen for pain relief. He requested a prescription for ibuprofen 800 mg. He avoids acetaminophen due to it causing vomiting.    Review of systems: See history of present illness, all others negative.     Current Outpatient Medications   Medication Sig Dispense Refill   ? predniSONE (DELTASONE) 5 MG tablet Six pills daily 3 days, 4 / d 3 days.  2/d 3days. 100 tablet 0   ? ibuprofen (ADVIL,MOTRIN) 800 MG tablet Take 1 tablet (800 mg total) by mouth every 8 (eight) hours as needed for pain. 21 tablet 2   ? indomethacin (INDOCIN) 50 MG capsule Take 1 capsule (50 mg total) by mouth 2 (two) times a day with meals. 20 capsule 0   ? oxyCODONE (ROXICODONE) 15 MG immediate release tablet Take 1 tablet (15 mg total) by mouth 2 (two) times a day as needed for pain. 28 tablet 0   ? predniSONE (DELTASONE) 20 MG tablet Take 20 mg by mouth daily for 7 days. 7 tablet 0     No current facility-administered medications for this visit.      Past Medical History:   Diagnosis Date   ? Gout attack       Past " Surgical History:   Procedure Laterality Date   ? ELBOW SURGERY Right 2017    gouty tophi      Social History     Tobacco Use   ? Smoking status: Never Smoker   ? Smokeless tobacco: Never Used   Substance Use Topics   ? Alcohol use: Not Currently     Frequency: Never   ? Drug use: Never        Family History   Problem Relation Age of Onset   ? Heart disease Mother    ? Heart attack Mother    ? Gout Father    ? Diabetes Father        Vitals:    09/02/19 1429   BP: (!) 153/105   Patient Site: Left Arm   Patient Position: Sitting   Cuff Size: Adult Large   Pulse: 86   Resp: 20   Temp: 99  F (37.2  C)   TempSrc: Oral   SpO2: 98%   Weight: (!) 238 lb 3 oz (108 kg)     Wt Readings from Last 3 Encounters:   09/02/19 (!) 238 lb 3 oz (108 kg)   08/21/19 (!) 236 lb (107 kg)   08/14/19 (!) 242 lb (109.8 kg)     Temp Readings from Last 3 Encounters:   09/02/19 99  F (37.2  C) (Oral)   08/10/19 99.4  F (37.4  C) (Oral)     BP Readings from Last 3 Encounters:   09/02/19 (!) 153/105   08/21/19 166/88   08/14/19 (!) 148/92     Pulse Readings from Last 3 Encounters:   09/02/19 86   08/21/19 92   08/14/19 80       EXAM:   General: Vital signs reviewed. Patient is in some distress due to right elbow discomfort. Breathing is non labored appearing. Patient is alert and oriented x 3.  He tends to hold his right upper extremity against and with flexion in the right elbow.    Right upper extremity exam: Patent has tender edema throughout the right olecranon region, with slight increased temperature in this area. No discoloration noted.     The narcotic had to be represcribed twice, due to a printer problem with no prescription paper in printer initially.  It was prescribed again because of a change in quantity.  Patient meant to tell me he took the narcotic medication 4 times daily, not twice daily as I had originally thought.  The ibuprofen also has to be represcribed due to a printer problem.    Assessment/Plan   zaid    Patient  Instructions   I will shred the first two printed narcotic prescriptions. Fill the one given for 28 tablets. Caution that the oxycodone may cause drowsiness and constipation, and can lead to psychological addiction in some people. There is also acetaminophen in this medication, so do not take other products containing this when using the prescription.      Patient Education     Gout Diet  Gout is a painful condition caused by an excess of uric acid, a waste product made by the body. Uric acid forms crystals that collect in the joints. The immune response to these crystals brings on symptoms of joint pain and swelling. This is called a gout attack. Often, medications and diet changes are combined to manage gout. Below are some guidelines for changing your diet to help you manage gout and prevent attacks. Your health care provider will help you determine the best eating plan for you.     Eating to manage gout  Weight loss for those who are overweight may help reduce gout attacks.  Eat less of these foods  Eating too many foods containing purines may raise the levels of uric acid in your body. This raises your risk for a gout attack. Try to limit these foods and drinks:    Alcohol, such as beer and red wine. You may be told to avoid alcohol completely.    Soft drinks that contain sugar or high fructose corn syrup    Certain fish, including anchovies, sardines, fish eggs, and herring    Shellfish    Certain meats, such as red meat, hot dogs, luncheon meats, and turkey    Organ meats, such as liver, kidneys, and sweetbreads    Legumes, such as dried beans and peas    Other high fat foods such as gravy, whole milk, and high fat cheeses    Vegetables such as asparagus, cauliflower, spinach, and mushrooms used to be thought to contribute to an increased risk for a gout attack, but recent studies show that high purine vegetables don't increase the risk for a gout attack.  Eat more of these foods  Other foods may be helpful  for people with gout. Add some of these foods to your diet:    Cherries contain chemicals that may lower uric acid.    Omega fatty acids. These are found in some fatty fish such as salmon, certain oils (flax, olive, or nut), and nuts themselves. Omega fatty acids may help prevent inflammation due to gout.    Dairy products that are low-fat or fat-free, such as cheese and yogurt    Complex carbohydrate foods, including whole grains, brown rice, oats, and beans    Coffee, in moderation    Water, approximately 64 ounces per day  Follow-up care  Follow up with your healthcare provider as advised.  When to seek medical advice  Call your healthcare provider right away if any of these occur:    Return of gout symptoms, usually at night:    Severe pain, swelling, and heat in a joint, especially the base of the big toe    Affected joint is hard to move    Skin of the affected joint is purple or red    Fever of 100.4 F (38 C) or higher    Pain that doesn't get better even with prescribed medicine   Date Last Reviewed: 1/12/2016 2000-2017 [employer]. [address] [place] [address]. All rights reserved. This information is not intended as a substitute for professional medical care. Always follow your healthcare professional's instructions.           Patient Education     Gout    Gout is an inflammation of a joint due to a build-up of gout crystals in the joint fluid. This occurs when there is an excess of uric acid (a normal waste product) in the body. Uric acid builds up in the body when the kidneys are unable to filter enough of it from the blood. This may occur with age. It is also associated with kidney disease. Gout occurs more often in people with obesity, diabetes, high blood pressure, or high levels of fats in the blood. It may run in families. Gout tends to come and go. A flare up of gout is called an attack. Drinking alcohol or eating certain foods (such as shellfish or foods with  additives such as high-fructose corn syrup) may increase uric acid levels in the blood and cause a gout attack.  During a gout attack, the affected joint may become a hot, red, swollen and painful. If you have had one attack of gout, you are likely to have another. An attack of gout can be treated with medicine. If these attacks become frequent, a daily medicine may be prescribed to help the kidneys remove uric acid from the body.  Home care  During a gout attack:    Rest painful joints. If gout affects the joints of your foot or leg, you may want to use crutches for the first few days to keep from bearing weight on the affected joint.    When sitting or lying down, raise the painful joint to a level higher than your heart.    Apply an ice pack (ice cubes in a plastic bag wrapped in a thin towel) over the injured area for 20 minutes every 1 to 2 hours the first day for pain relief. Continue this 3 to 4 times a day for swelling and pain.    Avoid alcohol and foods listed below (see Preventing attacks) during a gout attack. Drink extra fluid to help flush the uric acid through your kidneys.    If you were prescribed a medicine to treat gout, take it as your healthcare provider has instructed. Don't skip doses.    Take anti-inflammatory medicine as directed.     If pain medicines have been prescribed, take them exactly as directed.    Preventing attacks    Minimize or avoid alcohol use. Excess alcohol intake can cause a gout attack.    Limit these foods and beverages:  ? Organ meats, such as kidneys and liver  ? Certain seafoods (anchovies, sardines, shrimp, scallops, herring, mackerel)  ? Wild game, meat extracts and meat gravies  ? Foods and beverages sweetened with high-fructose corn syrup, such as sodas    Eat a healthy diet including low-fat and nonfat dairy, whole grains, and vegetables.    If you are overweight, talk to your healthcare provider about a weight reduction plan. Avoid fasting or extreme low calorie  diets (less than 900 calories per day). This will increase uric acid levels in the body.    If you have diabetes or high blood pressure, work with your doctor to manage these conditions.    Protect the joint from injury. Trauma can trigger a gout attack.  Follow-up care  Follow up with your healthcare provider, or as advised.   When to seek medical advice  Call your healthcare provider if you have any of the following:    Fever over 100.4 F (38. C) with worsening joint pain    Increasing redness around the joint    Pain developing in another joint    Repeated vomiting, abdominal pain, or blood in the vomit or stool (black or red color)  Date Last Reviewed: 3/1/2017    7188-7295 [employer]. [address] [place] [address], [place] [id number]. All rights reserved. This information is not intended as a substitute for professional medical care. Always follow your healthcare professional's instructions.              Maurizio Welch,

## 2023-06-08 NOTE — PATIENT INSTRUCTIONS - HE
Patient Instructions by Pipe Bush MD at 9/8/2019  1:10 PM     Author: Pipe Bush MD Service: -- Author Type: Physician    Filed: 9/8/2019  2:14 PM Encounter Date: 9/8/2019 Status: Addendum    : Pipe Bush MD (Physician)    Related Notes: Original Note by Pipe Bush MD (Physician) filed at 9/8/2019  2:14 PM       -Start new dose prednisone burst/taper to treat your gout/pseudogout flare.  -Take oxycodone only as needed for severe pain. This medication can be sedating. Do not drive within 8 hours of taking it. Do not drink alcoholic beverages while using this medication. Do not take other sedating medications while using this medication.   -Follow-up with Rheumatology as scheduled later this week.  Patient Education     Treating Gout Attacks     Raising the joint above the level of your heart can help reduce gout symptoms.     Gout is a disease that affects the joints. It is caused by excess uric acid in your blood stream that may lead to crystals forming in your joints. Left untreated, it can lead to painful foot and joint deformities and even kidney problems. But, by treating gout early, you can relieve pain and help prevent future problems. Gout can usually be treated with medication and proper diet. In severe cases, surgery may be needed.  Gout attacks are painful and often happen more than once. Taking medications may reduce pain and prevent attacks in the future. There are also some things you can do at home to relieve symptoms.  Medications for gout  Your healthcare provider may prescribe a daily medication to reduce levels of uric acid. Reducing your uric acid levels may help prevent gout attacks. Allopurinol is one commonly used medication taken daily to reduce uric acid levels. Other medications can help relieve pain and swelling during an acute attack. Medicines such as NSAIDs (nonsteroidal anti-inflammatory medicines), steroids, and colchicine may be prescribed  for intermittent use to relieve an acute gout attack. Be sure to take your medication as directed.  What you can do  Below are some things you can do at home to relieve gout symptoms. Your healthcare provider may have other tips.    Rest the painful joint as much as you can.    Raise the painful joint so it is at a level higher than your heart.    Use ice for 10 minutes every 1-2 hours as possible.  How can I prevent gout?  With a little effort, you may be able to prevent gout attacks in the future. Here are some things you can do:    Avoid foods high in purines  ? Certain meats (red meat, processed meat, turkey)  ? Organ meats (kidney, liver, sweetbread)  ? Shellfish (lobster, crab, shrimp, scallop, mussel)  ? Certain fish (anchovy, sardine, herring, mackerel)    Take any medications prescribed by your healthcare provider.    Lose weight if you need to.    Reduce high fructose corn syrup in meals and drinks.    Reduce or eliminate consumption of alcohol, particularly beer, but also red wine and spirits.    Control blood pressure, diabetes, and cholesterol.    Drink plenty of water to help flush uric acid from your body.  Date Last Reviewed: 2/1/2016 2000-2017 [employer]. [address] [place] [address], [place] [address]. All rights reserved. This information is not intended as a substitute for professional medical care. Always follow your healthcare professional's instructions.                 Unna Boot Text: An Unna boot was placed to help immobilize the limb and facilitate more rapid healing.